# Patient Record
Sex: FEMALE | Race: WHITE | HISPANIC OR LATINO
[De-identification: names, ages, dates, MRNs, and addresses within clinical notes are randomized per-mention and may not be internally consistent; named-entity substitution may affect disease eponyms.]

---

## 2017-02-24 ENCOUNTER — APPOINTMENT (OUTPATIENT)
Dept: DERMATOLOGY | Facility: CLINIC | Age: 72
End: 2017-02-24

## 2017-04-17 ENCOUNTER — APPOINTMENT (OUTPATIENT)
Dept: DERMATOLOGY | Facility: CLINIC | Age: 72
End: 2017-04-17

## 2018-03-01 ENCOUNTER — APPOINTMENT (OUTPATIENT)
Dept: DERMATOLOGY | Facility: CLINIC | Age: 73
End: 2018-03-01
Payer: MEDICARE

## 2018-03-01 PROCEDURE — 99214 OFFICE O/P EST MOD 30 MIN: CPT

## 2018-03-06 ENCOUNTER — TRANSCRIPTION ENCOUNTER (OUTPATIENT)
Age: 73
End: 2018-03-06

## 2019-03-05 ENCOUNTER — APPOINTMENT (OUTPATIENT)
Dept: DERMATOLOGY | Facility: CLINIC | Age: 74
End: 2019-03-05

## 2019-07-16 ENCOUNTER — APPOINTMENT (OUTPATIENT)
Dept: DERMATOLOGY | Facility: CLINIC | Age: 74
End: 2019-07-16
Payer: MEDICARE

## 2019-07-16 ENCOUNTER — RESULT REVIEW (OUTPATIENT)
Age: 74
End: 2019-07-16

## 2019-07-16 PROCEDURE — 99213 OFFICE O/P EST LOW 20 MIN: CPT | Mod: 25

## 2019-07-16 PROCEDURE — 17262 DSTRJ MAL LES T/A/L 1.1-2.0: CPT

## 2020-09-15 ENCOUNTER — RESULT REVIEW (OUTPATIENT)
Age: 75
End: 2020-09-15

## 2020-09-15 ENCOUNTER — APPOINTMENT (OUTPATIENT)
Dept: DERMATOLOGY | Facility: CLINIC | Age: 75
End: 2020-09-15
Payer: MEDICARE

## 2020-09-15 PROCEDURE — 11102 TANGNTL BX SKIN SINGLE LES: CPT

## 2020-09-15 PROCEDURE — 99214 OFFICE O/P EST MOD 30 MIN: CPT | Mod: 25

## 2020-10-19 ENCOUNTER — APPOINTMENT (OUTPATIENT)
Dept: DERMATOLOGY | Facility: CLINIC | Age: 75
End: 2020-10-19
Payer: MEDICARE

## 2020-10-19 PROCEDURE — 17262 DSTRJ MAL LES T/A/L 1.1-2.0: CPT

## 2020-10-19 PROCEDURE — 99213 OFFICE O/P EST LOW 20 MIN: CPT | Mod: 25

## 2021-12-06 ENCOUNTER — APPOINTMENT (OUTPATIENT)
Dept: DERMATOLOGY | Facility: CLINIC | Age: 76
End: 2021-12-06
Payer: MEDICARE

## 2021-12-06 PROCEDURE — 99214 OFFICE O/P EST MOD 30 MIN: CPT | Mod: 25

## 2021-12-06 PROCEDURE — 17000 DESTRUCT PREMALG LESION: CPT

## 2021-12-06 PROCEDURE — 17003 DESTRUCT PREMALG LES 2-14: CPT

## 2022-01-18 ENCOUNTER — APPOINTMENT (OUTPATIENT)
Dept: DERMATOLOGY | Facility: CLINIC | Age: 77
End: 2022-01-18
Payer: MEDICARE

## 2022-01-18 PROCEDURE — 99214 OFFICE O/P EST MOD 30 MIN: CPT

## 2022-09-12 ENCOUNTER — INPATIENT (INPATIENT)
Facility: HOSPITAL | Age: 77
LOS: 7 days | Discharge: NURSING, REHAB OR LONG-TERM CARE | DRG: 371 | End: 2022-09-20
Attending: HOSPITALIST | Admitting: STUDENT IN AN ORGANIZED HEALTH CARE EDUCATION/TRAINING PROGRAM
Payer: MEDICARE

## 2022-09-12 VITALS
RESPIRATION RATE: 18 BRPM | TEMPERATURE: 98 F | OXYGEN SATURATION: 96 % | DIASTOLIC BLOOD PRESSURE: 80 MMHG | HEART RATE: 90 BPM | WEIGHT: 160.06 LBS | SYSTOLIC BLOOD PRESSURE: 137 MMHG | HEIGHT: 68 IN

## 2022-09-12 DIAGNOSIS — K55.9 VASCULAR DISORDER OF INTESTINE, UNSPECIFIED: ICD-10-CM

## 2022-09-12 DIAGNOSIS — Z98.89 OTHER SPECIFIED POSTPROCEDURAL STATES: Chronic | ICD-10-CM

## 2022-09-12 LAB
ALBUMIN SERPL ELPH-MCNC: 2.6 G/DL — LOW (ref 3.3–5.2)
ALP SERPL-CCNC: 99 U/L — SIGNIFICANT CHANGE UP (ref 40–120)
ALT FLD-CCNC: 28 U/L — SIGNIFICANT CHANGE UP
ANION GAP SERPL CALC-SCNC: 9 MMOL/L — SIGNIFICANT CHANGE UP (ref 5–17)
APPEARANCE UR: CLEAR — SIGNIFICANT CHANGE UP
AST SERPL-CCNC: 53 U/L — HIGH
BACTERIA # UR AUTO: ABNORMAL
BASE EXCESS BLDV CALC-SCNC: 5.9 MMOL/L — HIGH (ref -2–3)
BASOPHILS # BLD AUTO: 0.06 K/UL — SIGNIFICANT CHANGE UP (ref 0–0.2)
BASOPHILS NFR BLD AUTO: 0.9 % — SIGNIFICANT CHANGE UP (ref 0–2)
BILIRUB SERPL-MCNC: 0.8 MG/DL — SIGNIFICANT CHANGE UP (ref 0.4–2)
BILIRUB UR-MCNC: NEGATIVE — SIGNIFICANT CHANGE UP
BUN SERPL-MCNC: 40.9 MG/DL — HIGH (ref 8–20)
CA-I SERPL-SCNC: 1.13 MMOL/L — LOW (ref 1.15–1.33)
CALCIUM SERPL-MCNC: 8.7 MG/DL — SIGNIFICANT CHANGE UP (ref 8.4–10.5)
CHLORIDE BLDV-SCNC: 101 MMOL/L — SIGNIFICANT CHANGE UP (ref 98–107)
CHLORIDE SERPL-SCNC: 102 MMOL/L — SIGNIFICANT CHANGE UP (ref 98–107)
CK SERPL-CCNC: 24 U/L — LOW (ref 25–170)
CO2 SERPL-SCNC: 27 MMOL/L — SIGNIFICANT CHANGE UP (ref 22–29)
COLOR SPEC: YELLOW — SIGNIFICANT CHANGE UP
CREAT SERPL-MCNC: 1.06 MG/DL — SIGNIFICANT CHANGE UP (ref 0.5–1.3)
DIFF PNL FLD: ABNORMAL
EGFR: 53 ML/MIN/1.73M2 — LOW
EOSINOPHIL # BLD AUTO: 0.25 K/UL — SIGNIFICANT CHANGE UP (ref 0–0.5)
EOSINOPHIL NFR BLD AUTO: 3.5 % — SIGNIFICANT CHANGE UP (ref 0–6)
EPI CELLS # UR: SIGNIFICANT CHANGE UP
FLUAV AG NPH QL: SIGNIFICANT CHANGE UP
FLUBV AG NPH QL: SIGNIFICANT CHANGE UP
GAS PNL BLDV: 133 MMOL/L — LOW (ref 136–145)
GAS PNL BLDV: SIGNIFICANT CHANGE UP
GLUCOSE BLDV-MCNC: 72 MG/DL — SIGNIFICANT CHANGE UP (ref 70–99)
GLUCOSE SERPL-MCNC: 72 MG/DL — SIGNIFICANT CHANGE UP (ref 70–99)
GLUCOSE UR QL: NEGATIVE MG/DL — SIGNIFICANT CHANGE UP
HCO3 BLDV-SCNC: 31 MMOL/L — HIGH (ref 22–29)
HCT VFR BLD CALC: 34.7 % — SIGNIFICANT CHANGE UP (ref 34.5–45)
HCT VFR BLDA CALC: 35 % — SIGNIFICANT CHANGE UP
HGB BLD CALC-MCNC: 11.6 G/DL — LOW (ref 11.7–16.1)
HGB BLD-MCNC: 11.7 G/DL — SIGNIFICANT CHANGE UP (ref 11.5–15.5)
HYALINE CASTS # UR AUTO: ABNORMAL /LPF
KETONES UR-MCNC: ABNORMAL
LACTATE BLDV-MCNC: 0.8 MMOL/L — SIGNIFICANT CHANGE UP (ref 0.5–2)
LEUKOCYTE ESTERASE UR-ACNC: ABNORMAL
LIDOCAIN IGE QN: 41 U/L — SIGNIFICANT CHANGE UP (ref 22–51)
LYMPHOCYTES # BLD AUTO: 0.88 K/UL — LOW (ref 1–3.3)
LYMPHOCYTES # BLD AUTO: 12.3 % — LOW (ref 13–44)
MANUAL SMEAR VERIFICATION: SIGNIFICANT CHANGE UP
MCHC RBC-ENTMCNC: 31.3 PG — SIGNIFICANT CHANGE UP (ref 27–34)
MCHC RBC-ENTMCNC: 33.7 GM/DL — SIGNIFICANT CHANGE UP (ref 32–36)
MCV RBC AUTO: 92.8 FL — SIGNIFICANT CHANGE UP (ref 80–100)
MONOCYTES # BLD AUTO: 0.32 K/UL — SIGNIFICANT CHANGE UP (ref 0–0.9)
MONOCYTES NFR BLD AUTO: 4.4 % — SIGNIFICANT CHANGE UP (ref 2–14)
NEUTROPHILS # BLD AUTO: 5.65 K/UL — SIGNIFICANT CHANGE UP (ref 1.8–7.4)
NEUTROPHILS NFR BLD AUTO: 78 % — HIGH (ref 43–77)
NEUTS BAND # BLD: 0.9 % — SIGNIFICANT CHANGE UP (ref 0–8)
NITRITE UR-MCNC: NEGATIVE — SIGNIFICANT CHANGE UP
OB PNL STL: POSITIVE
PCO2 BLDV: 54 MMHG — HIGH (ref 39–42)
PH BLDV: 7.37 — SIGNIFICANT CHANGE UP (ref 7.32–7.43)
PH UR: 6 — SIGNIFICANT CHANGE UP (ref 5–8)
PLAT MORPH BLD: NORMAL — SIGNIFICANT CHANGE UP
PLATELET # BLD AUTO: 449 K/UL — HIGH (ref 150–400)
PO2 BLDV: 51 MMHG — HIGH (ref 25–45)
POTASSIUM BLDV-SCNC: 4.7 MMOL/L — SIGNIFICANT CHANGE UP (ref 3.5–5.1)
POTASSIUM SERPL-MCNC: 4.6 MMOL/L — SIGNIFICANT CHANGE UP (ref 3.5–5.3)
POTASSIUM SERPL-SCNC: 4.6 MMOL/L — SIGNIFICANT CHANGE UP (ref 3.5–5.3)
PROT SERPL-MCNC: 5.6 G/DL — LOW (ref 6.6–8.7)
PROT UR-MCNC: 15
RBC # BLD: 3.74 M/UL — LOW (ref 3.8–5.2)
RBC # FLD: 13 % — SIGNIFICANT CHANGE UP (ref 10.3–14.5)
RBC BLD AUTO: NORMAL — SIGNIFICANT CHANGE UP
RBC CASTS # UR COMP ASSIST: ABNORMAL /HPF (ref 0–4)
RSV RNA NPH QL NAA+NON-PROBE: SIGNIFICANT CHANGE UP
SAO2 % BLDV: 79.4 % — SIGNIFICANT CHANGE UP
SARS-COV-2 RNA SPEC QL NAA+PROBE: SIGNIFICANT CHANGE UP
SODIUM SERPL-SCNC: 137 MMOL/L — SIGNIFICANT CHANGE UP (ref 135–145)
SP GR SPEC: 1.01 — SIGNIFICANT CHANGE UP (ref 1.01–1.02)
UROBILINOGEN FLD QL: NEGATIVE MG/DL — SIGNIFICANT CHANGE UP
WBC # BLD: 7.16 K/UL — SIGNIFICANT CHANGE UP (ref 3.8–10.5)
WBC # FLD AUTO: 7.16 K/UL — SIGNIFICANT CHANGE UP (ref 3.8–10.5)
WBC UR QL: SIGNIFICANT CHANGE UP /HPF (ref 0–5)

## 2022-09-12 PROCEDURE — 99285 EMERGENCY DEPT VISIT HI MDM: CPT

## 2022-09-12 PROCEDURE — 74177 CT ABD & PELVIS W/CONTRAST: CPT | Mod: 26,MA

## 2022-09-12 PROCEDURE — 70450 CT HEAD/BRAIN W/O DYE: CPT | Mod: 26,MA

## 2022-09-12 PROCEDURE — 71045 X-RAY EXAM CHEST 1 VIEW: CPT | Mod: 26

## 2022-09-12 PROCEDURE — 99223 1ST HOSP IP/OBS HIGH 75: CPT | Mod: FS

## 2022-09-12 PROCEDURE — 72125 CT NECK SPINE W/O DYE: CPT | Mod: 26,MA

## 2022-09-12 PROCEDURE — 93010 ELECTROCARDIOGRAM REPORT: CPT

## 2022-09-12 PROCEDURE — 99223 1ST HOSP IP/OBS HIGH 75: CPT

## 2022-09-12 RX ORDER — FAMOTIDINE 10 MG/ML
20 INJECTION INTRAVENOUS ONCE
Refills: 0 | Status: COMPLETED | OUTPATIENT
Start: 2022-09-12 | End: 2022-09-12

## 2022-09-12 RX ORDER — ONDANSETRON 8 MG/1
4 TABLET, FILM COATED ORAL ONCE
Refills: 0 | Status: COMPLETED | OUTPATIENT
Start: 2022-09-12 | End: 2022-09-12

## 2022-09-12 RX ORDER — PREGABALIN 225 MG/1
5000 CAPSULE ORAL DAILY
Refills: 0 | Status: DISCONTINUED | OUTPATIENT
Start: 2022-09-12 | End: 2022-09-12

## 2022-09-12 RX ORDER — ACETAMINOPHEN 500 MG
1000 TABLET ORAL ONCE
Refills: 0 | Status: COMPLETED | OUTPATIENT
Start: 2022-09-12 | End: 2022-09-12

## 2022-09-12 RX ORDER — HALOPERIDOL DECANOATE 100 MG/ML
2 INJECTION INTRAMUSCULAR ONCE
Refills: 0 | Status: COMPLETED | OUTPATIENT
Start: 2022-09-12 | End: 2022-09-12

## 2022-09-12 RX ORDER — HYDROMORPHONE HYDROCHLORIDE 2 MG/ML
0.5 INJECTION INTRAMUSCULAR; INTRAVENOUS; SUBCUTANEOUS ONCE
Refills: 0 | Status: DISCONTINUED | OUTPATIENT
Start: 2022-09-12 | End: 2022-09-12

## 2022-09-12 RX ORDER — LISINOPRIL 2.5 MG/1
10 TABLET ORAL DAILY
Refills: 0 | Status: DISCONTINUED | OUTPATIENT
Start: 2022-09-12 | End: 2022-09-20

## 2022-09-12 RX ORDER — CIPROFLOXACIN LACTATE 400MG/40ML
400 VIAL (ML) INTRAVENOUS EVERY 12 HOURS
Refills: 0 | Status: DISCONTINUED | OUTPATIENT
Start: 2022-09-12 | End: 2022-09-15

## 2022-09-12 RX ORDER — SODIUM CHLORIDE 9 MG/ML
1000 INJECTION, SOLUTION INTRAVENOUS
Refills: 0 | Status: DISCONTINUED | OUTPATIENT
Start: 2022-09-12 | End: 2022-09-17

## 2022-09-12 RX ORDER — ALPRAZOLAM 0.25 MG
0.5 TABLET ORAL
Refills: 0 | Status: DISCONTINUED | OUTPATIENT
Start: 2022-09-12 | End: 2022-09-19

## 2022-09-12 RX ORDER — SODIUM CHLORIDE 9 MG/ML
1000 INJECTION, SOLUTION INTRAVENOUS ONCE
Refills: 0 | Status: COMPLETED | OUTPATIENT
Start: 2022-09-12 | End: 2022-09-12

## 2022-09-12 RX ORDER — METRONIDAZOLE 500 MG
500 TABLET ORAL EVERY 8 HOURS
Refills: 0 | Status: DISCONTINUED | OUTPATIENT
Start: 2022-09-12 | End: 2022-09-15

## 2022-09-12 RX ORDER — FOLIC ACID 0.8 MG
1 TABLET ORAL DAILY
Refills: 0 | Status: DISCONTINUED | OUTPATIENT
Start: 2022-09-12 | End: 2022-09-20

## 2022-09-12 RX ORDER — ACETAMINOPHEN 500 MG
650 TABLET ORAL EVERY 6 HOURS
Refills: 0 | Status: DISCONTINUED | OUTPATIENT
Start: 2022-09-12 | End: 2022-09-20

## 2022-09-12 RX ADMIN — HYDROMORPHONE HYDROCHLORIDE 0.5 MILLIGRAM(S): 2 INJECTION INTRAMUSCULAR; INTRAVENOUS; SUBCUTANEOUS at 12:06

## 2022-09-12 RX ADMIN — HYDROMORPHONE HYDROCHLORIDE 0.5 MILLIGRAM(S): 2 INJECTION INTRAMUSCULAR; INTRAVENOUS; SUBCUTANEOUS at 13:00

## 2022-09-12 RX ADMIN — ONDANSETRON 4 MILLIGRAM(S): 8 TABLET, FILM COATED ORAL at 10:06

## 2022-09-12 RX ADMIN — Medication 100 MILLIGRAM(S): at 16:26

## 2022-09-12 RX ADMIN — SODIUM CHLORIDE 1000 MILLILITER(S): 9 INJECTION, SOLUTION INTRAVENOUS at 09:16

## 2022-09-12 RX ADMIN — FAMOTIDINE 20 MILLIGRAM(S): 10 INJECTION INTRAVENOUS at 09:01

## 2022-09-12 RX ADMIN — Medication 1000 MILLIGRAM(S): at 09:17

## 2022-09-12 RX ADMIN — SODIUM CHLORIDE 1000 MILLILITER(S): 9 INJECTION, SOLUTION INTRAVENOUS at 12:00

## 2022-09-12 RX ADMIN — HALOPERIDOL DECANOATE 2 MILLIGRAM(S): 100 INJECTION INTRAMUSCULAR at 17:09

## 2022-09-12 RX ADMIN — Medication 200 MILLIGRAM(S): at 17:30

## 2022-09-12 RX ADMIN — SODIUM CHLORIDE 100 MILLILITER(S): 9 INJECTION, SOLUTION INTRAVENOUS at 17:33

## 2022-09-12 RX ADMIN — HALOPERIDOL DECANOATE 2 MILLIGRAM(S): 100 INJECTION INTRAMUSCULAR at 20:51

## 2022-09-12 RX ADMIN — Medication 1000 MILLIGRAM(S): at 10:05

## 2022-09-12 RX ADMIN — Medication 400 MILLIGRAM(S): at 09:03

## 2022-09-12 NOTE — ED ADULT NURSE REASSESSMENT NOTE - NS ED NURSE REASSESS COMMENT FT1
Pt had a colonoscopy 3/28/2019 - unaware of results. GI doctor inquiring about test
Pt refusing to take Xanax or any other ordered medication. Refusing all medical treatment. Tried to redirect multiple times as well as family members attempting. MD Resendez made aware and IM medication to be ordered
Pt returned from CT. New IV in place. #20 to RFA. IV #20 to LAC infiltrated in CT. Bandage and dressing applied. Assisted to bathroom x2 with a steady gait. Will continue to monitor. Awaiting CT results. Made aware of status and agrees. Pt appears slightly disoriented r/t pain medication given
Pt attempting to get out of stretcher. AAO x2 to person and place. Pt stating that she wants to go home and take care of her dog. Pt trying to put her feet in the gown and also stripping naked. Multiple attempts to verbally redirect pt. Family brought to bedside to calm pt. MD Resendez made aware and to order medications.
Pt states increase in pain. MD made aware. Dilaudid to be ordered
Zofran 4 mg VO RB and verified to be given to pt. Provided perineal care to pt for incontinence of stool. Pt changed into gown

## 2022-09-12 NOTE — PATIENT PROFILE ADULT - FALL HARM RISK - UNIVERSAL INTERVENTIONS
Bed in lowest position, wheels locked, appropriate side rails in place/Call bell, personal items and telephone in reach/Instruct patient to call for assistance before getting out of bed or chair/Non-slip footwear when patient is out of bed/[place] to call system/Physically safe environment - no spills, clutter or unnecessary equipment/Purposeful Proactive Rounding/Room/bathroom lighting operational, light cord in reach

## 2022-09-12 NOTE — ED CLERICAL - NS ED CLERK UNITS
Recommend eucerin cream and vaseline for symptomatic relief. Can also ice the region for comfort as tolerated. Will recommend hydrocortisone 1% cream, available over the counter, as needed for breakthrough itchiness. If new symptoms develop such as weight loss, fevers, chills, night sweats, redness around the region, nipple purulence or discharge, please return to clinic or go to ED for further attention.    ANY ANY/ISO 5213-01/[id number]

## 2022-09-12 NOTE — ED PROVIDER NOTE - OBJECTIVE STATEMENT
78 yo F with pmh RA on MTX 76 yo F with pmh RA on MTX recent admission to [facility] for colitis and pyelo discharged approx 2 weeks ago p/w persistent intermittent diffuse abdominal pain since discharge. endorses nausea, no emesis, as well as multiple episodes of diarrhea over the last two days, no resolving with some red blood in stool. no fevers, no dysuria. per family at bedside, patient has been more altered, confused, looking for her late husbands. normally a&o x 3. family also endorsing decreased PO intake. daughter in law and son in law at bedside. daughter in law states that she found patient down on the floor (no trauma, lowered herself to ground). also notes that patient was trying to relieve her constipation (which brought her into hospital for last admission) with a pen.

## 2022-09-12 NOTE — ED ADULT NURSE NOTE - SUICIDE SCREENING QUESTION 1
Per Dr. Pamela Hernandez at [facility] generic tecfidera is preferred with her insurance.  Ok per Dr. Santiago for patient to be put on generic Tecfidera.  Per Mary she will update the prior authorization and we will receive a faxed confirmation then we can send to patient's pharmacy.   No

## 2022-09-12 NOTE — CHART NOTE - NSCHARTNOTEFT_GEN_A_CORE
Pt confused   continually trying to get out of bed  No improvement with haldol  daughter at bedside holding pt in bed  Pt Alert, confused  moves all extremities without difficulty  speech clear  VSS NAD  Ativan 1 mg IVP x1  1:1 ordered for pt safety

## 2022-09-12 NOTE — CHART NOTE - NSCHARTNOTEFT_GEN_A_CORE
Pt admitted with increased confusion  Head CT completed  Haldol given earlier with good effect  Pt confused and agitated at this time  VSS  Haldol 2mg IM x 1 ordered  Continue to monitor

## 2022-09-12 NOTE — H&P ADULT - NSHPPHYSICALEXAM_GEN_ALL_CORE
General: appears uncomfortable in bed, NAD  HEENT: atraumatic, normocephalic. PERRLA, EOMI  Cardio: regular rate and rhythm  Resp: clear to auscultation, no increased work of breathing  Abdomen: Tender to palpation on LUQ and LLQ  Skin: Ecchymosis on bilateral upper extremities  Neuro: AOx3, occasional discrepencies in history compared to daughter's account

## 2022-09-12 NOTE — ED PROVIDER NOTE - CLINICAL SUMMARY MEDICAL DECISION MAKING FREE TEXT BOX
presents for abdominal pain and FTT in the setting of recent colitis and UTI/pyelo. completed abx course for urinary infection (cefpodoxime). exam as noted above. will get xray to eval for free air given patient was reportedly trying to relieve constipation with pen, although abd is soft. will also eval for colitis/complications of colitis vs other surgical pathology in abdomen. no concern for mesenteric ischemia or ischemic colitis at this time. no gross lood on rectal exam. will proceed with CT with iv contrast, labs, symptom control, UA. To be admitted ryan given patient not tolerating PO at home.  per daughter in law, patient will likely not tolerate PO contrast

## 2022-09-12 NOTE — CONSULT NOTE ADULT - SUBJECTIVE AND OBJECTIVE BOX
HISTORY OF PRESENT ILLNESS: 76 yo female with PMHx rheumatoid arthritis on Methotrexate, HTN, fibromyalgia presented to ED with intermittent diffuse abdominal pain x 3 weeks. Patient was recently admitted to [facility] with abdominal pain, treated for UTI  and pyelonephritis, was discharged home 2 weeks ago. Patient continued to have intermittent abdominal pain. Patient reports occasional nausea, denies vomiting, constipation, diarrhea, or melena. Last BM here in ED, reported as loose brown stool. Patient never went to a gastroenterologist or had a colonoscopy/ EGD in the past. Patient's daughter at the bedside reports have 3-4 loose BM per day. Patient was recently started on Diclofenac "1 tab 2 times a day" and also takes Methotrexate. Not on any blood thinners. Patient endorses some  weight loss, refuses to answer further details. Denies use of ETOH. Former smoker quit 40 years ago. Denies nausea, vomiting, abdominal pain, chest pain, shortness of breath, hematemesis, hematochezia, melena.enies fever, chills, chest pain, palpitation. CT abdomen revealed colitis extending from distal transverse colon through sigmoids colon.       Review of Systems:  . Constitutional: No fever, chills, no weight gain, weight loss   · ENMT: no vertigo, no throat pain  . Neck: No pain or stiffness  · Respiratory and Thorax: No shortness of breath, no cough, no wheezing  · Cardiovascular: No chest pain, palpitation, no dizziness, no orthopnea,   · Gastrointestinal: see above.  · Genitourinary: No hematuria, no dysuria  · Musculoskeletal: Negative  · Neurological: no headache, no vision changes, no slurred speech  · Psychiatric: no agitation, no anxiety  · Hematology/Lymphatics: negative  · Endocrine: negative      ROS: A 14-point review of systems was completed and was otherwise negative save what was reported in the HPI.    PAST MEDICAL/SURGICAL HISTORY:  Hypertension    Rheumatoid arthritis    Depressed    Fibromyalgia    H/O lithotripsy      SOCIAL HISTORY:  - TOBACCO: Denies  - ALCOHOL: Denies  - ILLICIT DRUG USE: Denies    FAMILY HISTORY:  No known history of gastrointestinal or liver disease;      HOME MEDICATIONS:  abatacept: 400 milligram(s) intravenous every 30 days (12 Sep 2022 14:02)  acetaminophen 325 mg oral tablet: 2 tab(s) orally every 6 hours, As Needed (12 Sep 2022 14:03)  ALPRAZolam 0.5 mg oral tablet: 1 tab(s) orally 2 times a day, As Needed (12 Sep 2022 14:)  biotin 5000 mcg oral capsule: 1 cap(s) orally once a day (12 Sep 2022 14:)  cefpodoxime 200 mg oral tablet: 1 tab(s) orally every 12 hours (12 Sep 2022 14:03)  cyanocobalamin 5000 mcg sublingual tablet: 1 tab(s) sublingual once a day (12 Sep 2022 14:)  diclofenac sodium 75 mg oral delayed release tablet: 1 tab(s) orally 2 times a day (12 Sep 2022 14:03)  folic acid 1 mg oral tablet: 1 tab(s) orally 2 times a day (12 Sep 2022 14:)  methotrexate 2.5 mg oral tablet: 6 tab(s) orally once a week on  (12 Sep 2022 14:03)  ramipril 2.5 mg oral capsule: 1 cap(s) orally once a day (12 Sep 2022 14:03)  triamcinolone 0.1% topical cream: Apply topically to affected area 3 times a day, As Needed (12 Sep 2022 14:02)  Vitamin D2 1.25 mg (50,000 intl units) oral capsule: 1 cap(s) orally 2 times a week (12 Sep 2022 14:)    INPATIENT MEDICATIONS:  MEDICATIONS  (STANDING):    MEDICATIONS  (PRN):    ALLERGIES:  adhesives (Unknown)  latex (Blisters)  statins (Unknown)    T(C): 36.6 (22 @ 07:47), Max: 36.6 (22 @ 07:47)  HR: 90 (22 @ 07:47) (90 - 90)  BP: 137/80 (22 @ 07:47) (137/80 - 137/80)  RR: 18 (22 @ 07:47) (18 - 18)  SpO2: 96% (22 @ 07:47) (96% - 96%)      PHYSICAL EXAM:  Constitutional: Thin appearing elderly woman, in no acute distress  Neuro: Awake alert, oriented  HEENT: PERRL, anicteric sclerae  Neck: supple, no JVD  CV: Irregular, S1S2,   Pulm/chest: lung sounds clear bilaterally, no accessory muscle use noted  Abd: soft, nondistended +diffuse abdominal tenderness +bowel sounds. No rigidity, rebound tenderness, guarding.   Rectal exam: Brown soft stool on glove. No melena or bleeding   Ext: no Cyanosis, clubbing or edema  Skin: warm, no jaundice   Psych: calm, cooperative      LABS:             11.7   7.16  )-----------( 449      (  @ 08:52 )             34.7             137  |  102  |  40.9<H>  ----------------------------<  72  4.6   |  27.0  |  1.06    Ca    8.7      12 Sep 2022 08:52    TPro  5.6<L>  /  Alb  2.6<L>  /  TBili  0.8  /  DBili  x   /  AST  53<H>  /  ALT  28  /  AlkPhos  99      LIVER FUNCTIONS - ( 12 Sep 2022 08:52 )  Alb: 2.6 g/dL / Pro: 5.6 g/dL / ALK PHOS: 99 U/L / ALT: 28 U/L / AST: 53 U/L / GGT: x             Lipase, Serum: 41 U/L (22 @ 08:52)        Urinalysis Basic - ( 12 Sep 2022 09:30 )    Color: Yellow / Appearance: Clear / S.015 / pH: x  Gluc: x / Ketone: Small  / Bili: Negative / Urobili: Negative mg/dL   Blood: x / Protein: 15 / Nitrite: Negative   Leuk Esterase: Moderate / RBC: 3-5 /HPF / WBC 3-5 /HPF   Sq Epi: x / Non Sq Epi: Few / Bacteria: Few        IMAGING: I personally reviewed the XXXX, and I agree with the radiologist's interpretation as described below:

## 2022-09-12 NOTE — H&P ADULT - NSHPLABSRESULTS_GEN_ALL_CORE
CBC Full  -  ( 12 Sep 2022 08:52 )  WBC Count : 7.16 K/uL  RBC Count : 3.74 M/uL  Hemoglobin : 11.7 g/dL  Hematocrit : 34.7 %  Platelet Count - Automated : 449 K/uL  Mean Cell Volume : 92.8 fl  Mean Cell Hemoglobin : 31.3 pg  Mean Cell Hemoglobin Concentration : 33.7 gm/dL  Auto Neutrophil # : 5.65 K/uL  Auto Lymphocyte # : 0.88 K/uL  Auto Monocyte # : 0.32 K/uL  Auto Eosinophil # : 0.25 K/uL  Auto Basophil # : 0.06 K/uL  Auto Neutrophil % : 78.0 %  Auto Lymphocyte % : 12.3 %  Auto Monocyte % : 4.4 %  Auto Eosinophil % : 3.5 %  Auto Basophil % : 0.9 %      09-12    137  |  102  |  40.9<H>  ----------------------------<  72  4.6   |  27.0  |  1.06    Ca    8.7      12 Sep 2022 08:52    TPro  5.6<L>  /  Alb  2.6<L>  /  TBili  0.8  /  DBili  x   /  AST  53<H>  /  ALT  28  /  AlkPhos  99  09-12

## 2022-09-12 NOTE — ED ADULT NURSE NOTE - OBJECTIVE STATEMENT
Pt to ED from home c/o b/l lower quadrant pain associated with nausea and decreased appetite. Family at bedside states she was admitted to [facility] 2 wks ago for UTI and colitis. States since discharge, pt has had a decreased appetite and acute AMS. Also reports pt is "failing to thrive." Pt arrives AAO x3. Moaning in pain. Assisted MD with rectal exam and rectal temp. No temp. Denies fevers.

## 2022-09-12 NOTE — ED ADULT TRIAGE NOTE - CHIEF COMPLAINT QUOTE
biba home - a&ox4 LLQ/RLQ, + nausea x 2 weeks. recently d/c from [facility] for + colitis, UTI, pyelo. family reports pt intermittently having AMS, decreased appetite/intake. denies cp/sob.

## 2022-09-12 NOTE — ED PROVIDER NOTE - PROGRESS NOTE DETAILS
xray chest neg for free air Mary Anne Moore MD Attending Physician- patient reassessed, patient in line for CT. still endorsing abdominal pain with tenderness on exam. s/p tylenol and pepcid. will hold off on motrin/toradol given occult positive stool. patient and daughter in law state that morphine does not help pain. would like to try dilaudid 0.5 mg

## 2022-09-12 NOTE — H&P ADULT - ASSESSMENT
78 yo female w PMHx RA, HTN, fibromyalgia presents complaining of diffuse abdominal pain. Per pt and her daughter, pt admitted to [facility] for constipation and abdominal pain, discharged 2 weeks ago with dx of UTI and pyelonephritis. Since discharge, pt has had continued diffuse abdominal pain that waxes and wanes through the day, relieved especially after sleeping through the night. Pt also endorses decreased appetite, nausea, and watery, malodorous diarrhea 3-4 times daily. Patient's daughter notes increasing confusion compared to normal baseline.  Pt denies fevers, chills, headache, vomiting, chest pain, palpitations, shortness of breath, and dysuria.       1. Colitis likely ischemic vs infectious   Admit to medicine   Afebrile, NO WBC   CTA abdomen done and results reviewed   Send stool studies and C-diff PCR   Keep NPO   IV fluids   Start Cipro and Metronidazole   Follow cultures   GI consult called       2. HTN  Start Lisinopril 10 mg po daily instead of ramipril     3. Hx of RA and Fibromyalgia   On weekly methotrexate, last dose on friday   c/w with Supplements     4. Anxiety   c/w Xanax PRN     5. DVT prophylaxis: compression boots     Plan of care discussed with patient and daughter at bedside

## 2022-09-12 NOTE — ED PROVIDER NOTE - PHYSICAL EXAMINATION
PHYSICAL EXAM:   General: appears uncomfortable, laying on her side, rigoring in bed  HEENT: NC/AT, PERRLA, airway patent, no cspine tenderness  Cardiovascular: regular rate and rhythm, + S1/S2, no murmurs, rubs, gallops appreciated  Respiratory:  nonlabored respirations  Abdominal: soft, diffusely tender, nondistended, +guarding  Back: no costovertebral tenderness, no midline spinal tenderness  Extremities: no LE edema or calf tenderness b/l. Radial pulses equal and strong b/l  Neuro: Alert and oriented x3. no lateralizing deficits  Psychiatric: appropriate mood and affect.   Skin: scattered ecchymosis to extremities (states from prior IV draws)  rectal: performed with JESSIKA paredes as chaperone: no external hemorrhoids, no internal masses palpated, brown stool  -Mary Anne Moore MD Attending Physician

## 2022-09-12 NOTE — CONSULT NOTE ADULT - ASSESSMENT
76 yo female with PMHx rheumatoid arthritis on Methotrexate, HTN, fibromyalgia presented to ED with intermittent diffuse abdominal pain x 3 weeks. CT abdomen revealed colitis extending from distal transverse colon through sigmoids colon. GI consult for positive FOBT, colitis.      Plan:  Most likely from ischemic colitis. Normal lactate on arrival. 12 lead EKG shows NSR with occasional APCs, no atrial fibrillation or aflutter.   Continue IV fluids  IV antibiotic Cipro and Flagyl   GI PCR and C diff to r/o infectious colitis   Avoid hypotension, dehydration  Serial abdominal exam  Trend CBC, renal function  Analgesics as needed. Avoid narcotics. Avoid constipation.   Trend CBC, transfuse to Hgb 7 or higher.

## 2022-09-12 NOTE — CONSULT NOTE ADULT - NS ATTEND AMEND GEN_ALL_CORE FT
Patient seen and examined.  Patient with abdominal pain and diarrhea.  Unclear history.  Difficult to obtain history from the patient.  Daughter is at bedside.  Recent antibiotics for UTI.  Changes of segmental colitis.  Question of infectious versus ischemic colitis.  Will recommend antibiotics and then consider stool studies.  GI PCR, C. difficile.  Once infectious work-up is complete, may need eventual outpatient colonoscopy.

## 2022-09-12 NOTE — H&P ADULT - HISTORY OF PRESENT ILLNESS
76 yo female w PMHx RA, HTN, fibromyalgia presents complaining of diffuse abdominal pain. Per pt and her daughter, pt admitted to [facility] for constipation and abdominal pain, discharged 2 weeks ago with dx of UTI and pyelonephritis. Since discharge, pt has had continued diffuse abdominal pain that waxes and wanes through the day, relieved especially after sleeping through the night. Pt endorses accompanying nausea and watery, malodorous diarrhea 3-4 times daily.    Pt denies fevers, chills, headache, vomiting, chest pain, palpitations, shortness of breath, and dysuria.   Patient's daughter notes increasing confusion compared to normal baseline, as well as decreased po intake.  78 yo female w PMHx RA, HTN, fibromyalgia presents complaining of diffuse abdominal pain. Per pt and her daughter, pt admitted to [facility] for constipation and abdominal pain, discharged 2 weeks ago with dx of UTI and pyelonephritis. Since discharge, pt has had continued diffuse abdominal pain that waxes and wanes through the day, relieved especially after sleeping through the night. Pt also endorses decreased appetite, nausea, and watery, malodorous diarrhea 3-4 times daily. Patient's daughter notes increasing confusion compared to normal baseline.  Pt denies fevers, chills, headache, vomiting, chest pain, palpitations, shortness of breath, and dysuria.

## 2022-09-12 NOTE — ED PROVIDER NOTE - NS ED ROS FT
REVIEW OF SYSTEMS:  General:  no fever  Cardiac: no chest pain  Respiratory: no cough, no shortness of breath  Gastrointestinal: + abdominal pain, + nausea, no vomiting, + diarrhea with red blood  Genitourinary: , no dysuria  Neuro: +AMS  Heme: +easy bruising, not on AC  -Mary Anne Moore MD Attending Physician

## 2022-09-13 LAB
ANION GAP SERPL CALC-SCNC: 12 MMOL/L — SIGNIFICANT CHANGE UP (ref 5–17)
ANISOCYTOSIS BLD QL: SLIGHT — SIGNIFICANT CHANGE UP
BASOPHILS # BLD AUTO: 0 K/UL — SIGNIFICANT CHANGE UP (ref 0–0.2)
BASOPHILS NFR BLD AUTO: 0 % — SIGNIFICANT CHANGE UP (ref 0–2)
BUN SERPL-MCNC: 24.6 MG/DL — HIGH (ref 8–20)
CALCIUM SERPL-MCNC: 8.8 MG/DL — SIGNIFICANT CHANGE UP (ref 8.4–10.5)
CHLORIDE SERPL-SCNC: 101 MMOL/L — SIGNIFICANT CHANGE UP (ref 98–107)
CO2 SERPL-SCNC: 26 MMOL/L — SIGNIFICANT CHANGE UP (ref 22–29)
CREAT SERPL-MCNC: 0.89 MG/DL — SIGNIFICANT CHANGE UP (ref 0.5–1.3)
EGFR: 66 ML/MIN/1.73M2 — SIGNIFICANT CHANGE UP
ELLIPTOCYTES BLD QL SMEAR: SLIGHT — SIGNIFICANT CHANGE UP
EOSINOPHIL # BLD AUTO: 0.05 K/UL — SIGNIFICANT CHANGE UP (ref 0–0.5)
EOSINOPHIL NFR BLD AUTO: 0.9 % — SIGNIFICANT CHANGE UP (ref 0–6)
GLUCOSE SERPL-MCNC: 76 MG/DL — SIGNIFICANT CHANGE UP (ref 70–99)
HCT VFR BLD CALC: 34.6 % — SIGNIFICANT CHANGE UP (ref 34.5–45)
HGB BLD-MCNC: 11.6 G/DL — SIGNIFICANT CHANGE UP (ref 11.5–15.5)
LACTATE BLDV-MCNC: 1.5 MMOL/L — SIGNIFICANT CHANGE UP (ref 0.5–2)
LYMPHOCYTES # BLD AUTO: 0.62 K/UL — LOW (ref 1–3.3)
LYMPHOCYTES # BLD AUTO: 12.3 % — LOW (ref 13–44)
MAGNESIUM SERPL-MCNC: 1.8 MG/DL — SIGNIFICANT CHANGE UP (ref 1.6–2.6)
MANUAL SMEAR VERIFICATION: SIGNIFICANT CHANGE UP
MCHC RBC-ENTMCNC: 30.9 PG — SIGNIFICANT CHANGE UP (ref 27–34)
MCHC RBC-ENTMCNC: 33.5 GM/DL — SIGNIFICANT CHANGE UP (ref 32–36)
MCV RBC AUTO: 92.3 FL — SIGNIFICANT CHANGE UP (ref 80–100)
MICROCYTES BLD QL: SLIGHT — SIGNIFICANT CHANGE UP
MONOCYTES # BLD AUTO: 0.13 K/UL — SIGNIFICANT CHANGE UP (ref 0–0.9)
MONOCYTES NFR BLD AUTO: 2.6 % — SIGNIFICANT CHANGE UP (ref 2–14)
NEUTROPHILS # BLD AUTO: 4.21 K/UL — SIGNIFICANT CHANGE UP (ref 1.8–7.4)
NEUTROPHILS NFR BLD AUTO: 82.5 % — HIGH (ref 43–77)
NEUTS BAND # BLD: 1.7 % — SIGNIFICANT CHANGE UP (ref 0–8)
PLAT MORPH BLD: NORMAL — SIGNIFICANT CHANGE UP
PLATELET # BLD AUTO: 400 K/UL — SIGNIFICANT CHANGE UP (ref 150–400)
POIKILOCYTOSIS BLD QL AUTO: SLIGHT — SIGNIFICANT CHANGE UP
POLYCHROMASIA BLD QL SMEAR: SLIGHT — SIGNIFICANT CHANGE UP
POTASSIUM SERPL-MCNC: 4.4 MMOL/L — SIGNIFICANT CHANGE UP (ref 3.5–5.3)
POTASSIUM SERPL-SCNC: 4.4 MMOL/L — SIGNIFICANT CHANGE UP (ref 3.5–5.3)
RBC # BLD: 3.75 M/UL — LOW (ref 3.8–5.2)
RBC # FLD: 13.2 % — SIGNIFICANT CHANGE UP (ref 10.3–14.5)
RBC BLD AUTO: ABNORMAL
SMUDGE CELLS # BLD: PRESENT — SIGNIFICANT CHANGE UP
SODIUM SERPL-SCNC: 139 MMOL/L — SIGNIFICANT CHANGE UP (ref 135–145)
TSH SERPL-MCNC: 5.66 UIU/ML — HIGH (ref 0.27–4.2)
WBC # BLD: 5 K/UL — SIGNIFICANT CHANGE UP (ref 3.8–10.5)
WBC # FLD AUTO: 5 K/UL — SIGNIFICANT CHANGE UP (ref 3.8–10.5)

## 2022-09-13 PROCEDURE — 99233 SBSQ HOSP IP/OBS HIGH 50: CPT

## 2022-09-13 RX ORDER — FOLIC ACID 0.8 MG
1 TABLET ORAL
Qty: 0 | Refills: 0 | DISCHARGE

## 2022-09-13 RX ORDER — ABATACEPT 125 MG/ML
400 INJECTION, SOLUTION SUBCUTANEOUS
Qty: 0 | Refills: 0 | DISCHARGE

## 2022-09-13 RX ORDER — HALOPERIDOL DECANOATE 100 MG/ML
5 INJECTION INTRAMUSCULAR ONCE
Refills: 0 | Status: COMPLETED | OUTPATIENT
Start: 2022-09-13 | End: 2022-09-13

## 2022-09-13 RX ORDER — ONDANSETRON 8 MG/1
4 TABLET, FILM COATED ORAL EVERY 6 HOURS
Refills: 0 | Status: DISCONTINUED | OUTPATIENT
Start: 2022-09-13 | End: 2022-09-20

## 2022-09-13 RX ORDER — ICOSAPENT ETHYL 500 MG/1
2 CAPSULE, LIQUID FILLED ORAL
Qty: 0 | Refills: 0 | DISCHARGE

## 2022-09-13 RX ORDER — MORPHINE SULFATE 50 MG/1
2 CAPSULE, EXTENDED RELEASE ORAL ONCE
Refills: 0 | Status: DISCONTINUED | OUTPATIENT
Start: 2022-09-13 | End: 2022-09-13

## 2022-09-13 RX ORDER — PREGABALIN 225 MG/1
1 CAPSULE ORAL
Qty: 0 | Refills: 0 | DISCHARGE

## 2022-09-13 RX ORDER — RAMIPRIL 5 MG
1 CAPSULE ORAL
Qty: 0 | Refills: 0 | DISCHARGE

## 2022-09-13 RX ORDER — HYDROMORPHONE HYDROCHLORIDE 2 MG/ML
0.5 INJECTION INTRAMUSCULAR; INTRAVENOUS; SUBCUTANEOUS ONCE
Refills: 0 | Status: DISCONTINUED | OUTPATIENT
Start: 2022-09-13 | End: 2022-09-13

## 2022-09-13 RX ORDER — ALPRAZOLAM 0.25 MG
1 TABLET ORAL
Qty: 0 | Refills: 0 | DISCHARGE

## 2022-09-13 RX ORDER — ERGOCALCIFEROL 1.25 MG/1
1 CAPSULE ORAL
Qty: 0 | Refills: 0 | DISCHARGE

## 2022-09-13 RX ADMIN — Medication 200 MILLIGRAM(S): at 17:12

## 2022-09-13 RX ADMIN — Medication 100 MILLIGRAM(S): at 00:20

## 2022-09-13 RX ADMIN — Medication 100 MILLIGRAM(S): at 02:25

## 2022-09-13 RX ADMIN — HALOPERIDOL DECANOATE 5 MILLIGRAM(S): 100 INJECTION INTRAMUSCULAR at 20:20

## 2022-09-13 RX ADMIN — Medication 100 MILLIGRAM(S): at 15:46

## 2022-09-13 RX ADMIN — HYDROMORPHONE HYDROCHLORIDE 0.5 MILLIGRAM(S): 2 INJECTION INTRAMUSCULAR; INTRAVENOUS; SUBCUTANEOUS at 02:24

## 2022-09-13 RX ADMIN — HYDROMORPHONE HYDROCHLORIDE 0.5 MILLIGRAM(S): 2 INJECTION INTRAMUSCULAR; INTRAVENOUS; SUBCUTANEOUS at 02:39

## 2022-09-13 RX ADMIN — SODIUM CHLORIDE 100 MILLILITER(S): 9 INJECTION, SOLUTION INTRAVENOUS at 09:03

## 2022-09-13 RX ADMIN — Medication 100 MILLIGRAM(S): at 23:19

## 2022-09-13 RX ADMIN — Medication 650 MILLIGRAM(S): at 17:58

## 2022-09-13 RX ADMIN — Medication 200 MILLIGRAM(S): at 07:24

## 2022-09-13 RX ADMIN — Medication 0.5 MILLIGRAM(S): at 14:13

## 2022-09-13 RX ADMIN — Medication 1 MILLIGRAM(S): at 00:19

## 2022-09-13 RX ADMIN — Medication 650 MILLIGRAM(S): at 19:00

## 2022-09-13 NOTE — CHART NOTE - NSCHARTNOTEFT_GEN_A_CORE
Pt confused and agitated  2 daughters at bedside  Pt awake alert  /79 P 104 R 20 PO 97% T 98.5  Abd soft nontender no distention  Pt has been urinating  CBC Lactate  Dilaudid 0.5mg IVP  Discussed with Dr Alvarez  continue to monitor Pt confused and agitated  2 daughters at bedside  Pt awake alert  /79 P 104 R 20 PO 97% T 98.5  Abd soft nontender no distention  Pt has been urinating  CBC WBC 5.00  Lactate 1.50  Dilaudid 0.5mg IVP  Discussed with Dr Alvarez  continue to monitor

## 2022-09-14 DIAGNOSIS — R19.7 DIARRHEA, UNSPECIFIED: ICD-10-CM

## 2022-09-14 LAB
-  AMIKACIN: SIGNIFICANT CHANGE UP
-  AMOXICILLIN/CLAVULANIC ACID: SIGNIFICANT CHANGE UP
-  AMPICILLIN/SULBACTAM: SIGNIFICANT CHANGE UP
-  AMPICILLIN: SIGNIFICANT CHANGE UP
-  AZTREONAM: SIGNIFICANT CHANGE UP
-  CEFAZOLIN: SIGNIFICANT CHANGE UP
-  CEFEPIME: SIGNIFICANT CHANGE UP
-  CEFOXITIN: SIGNIFICANT CHANGE UP
-  CEFTRIAXONE: SIGNIFICANT CHANGE UP
-  CIPROFLOXACIN: SIGNIFICANT CHANGE UP
-  ERTAPENEM: SIGNIFICANT CHANGE UP
-  GENTAMICIN: SIGNIFICANT CHANGE UP
-  IMIPENEM: SIGNIFICANT CHANGE UP
-  LEVOFLOXACIN: SIGNIFICANT CHANGE UP
-  MEROPENEM: SIGNIFICANT CHANGE UP
-  NITROFURANTOIN: SIGNIFICANT CHANGE UP
-  PIPERACILLIN/TAZOBACTAM: SIGNIFICANT CHANGE UP
-  TIGECYCLINE: SIGNIFICANT CHANGE UP
-  TOBRAMYCIN: SIGNIFICANT CHANGE UP
-  TRIMETHOPRIM/SULFAMETHOXAZOLE: SIGNIFICANT CHANGE UP
ALBUMIN SERPL ELPH-MCNC: 2.5 G/DL — LOW (ref 3.3–5.2)
ALP SERPL-CCNC: 73 U/L — SIGNIFICANT CHANGE UP (ref 40–120)
ALT FLD-CCNC: 17 U/L — SIGNIFICANT CHANGE UP
ANION GAP SERPL CALC-SCNC: 10 MMOL/L — SIGNIFICANT CHANGE UP (ref 5–17)
AST SERPL-CCNC: 24 U/L — SIGNIFICANT CHANGE UP
BASOPHILS # BLD AUTO: 0.04 K/UL — SIGNIFICANT CHANGE UP (ref 0–0.2)
BASOPHILS NFR BLD AUTO: 1.8 % — SIGNIFICANT CHANGE UP (ref 0–2)
BILIRUB SERPL-MCNC: 0.9 MG/DL — SIGNIFICANT CHANGE UP (ref 0.4–2)
BUN SERPL-MCNC: 11.3 MG/DL — SIGNIFICANT CHANGE UP (ref 8–20)
C DIFF BY PCR RESULT: DETECTED
C DIFF TOX GENS STL QL NAA+PROBE: SIGNIFICANT CHANGE UP
CALCIUM SERPL-MCNC: 8 MG/DL — LOW (ref 8.4–10.5)
CHLORIDE SERPL-SCNC: 102 MMOL/L — SIGNIFICANT CHANGE UP (ref 98–107)
CO2 SERPL-SCNC: 23 MMOL/L — SIGNIFICANT CHANGE UP (ref 22–29)
CREAT SERPL-MCNC: 0.66 MG/DL — SIGNIFICANT CHANGE UP (ref 0.5–1.3)
CULTURE RESULTS: SIGNIFICANT CHANGE UP
EGFR: 89 ML/MIN/1.73M2 — SIGNIFICANT CHANGE UP
EOSINOPHIL # BLD AUTO: 0.08 K/UL — SIGNIFICANT CHANGE UP (ref 0–0.5)
EOSINOPHIL NFR BLD AUTO: 3.6 % — SIGNIFICANT CHANGE UP (ref 0–6)
GIANT PLATELETS BLD QL SMEAR: PRESENT — SIGNIFICANT CHANGE UP
GLUCOSE SERPL-MCNC: 94 MG/DL — SIGNIFICANT CHANGE UP (ref 70–99)
HCT VFR BLD CALC: 32.4 % — LOW (ref 34.5–45)
HGB BLD-MCNC: 10.7 G/DL — LOW (ref 11.5–15.5)
LYMPHOCYTES # BLD AUTO: 0.78 K/UL — LOW (ref 1–3.3)
LYMPHOCYTES # BLD AUTO: 33.3 % — SIGNIFICANT CHANGE UP (ref 13–44)
MANUAL SMEAR VERIFICATION: SIGNIFICANT CHANGE UP
MCHC RBC-ENTMCNC: 30.7 PG — SIGNIFICANT CHANGE UP (ref 27–34)
MCHC RBC-ENTMCNC: 33 GM/DL — SIGNIFICANT CHANGE UP (ref 32–36)
MCV RBC AUTO: 93.1 FL — SIGNIFICANT CHANGE UP (ref 80–100)
METHOD TYPE: SIGNIFICANT CHANGE UP
MONOCYTES # BLD AUTO: 0.23 K/UL — SIGNIFICANT CHANGE UP (ref 0–0.9)
MONOCYTES NFR BLD AUTO: 9.9 % — SIGNIFICANT CHANGE UP (ref 2–14)
NEUTROPHILS # BLD AUTO: 1.2 K/UL — LOW (ref 1.8–7.4)
NEUTROPHILS NFR BLD AUTO: 51.4 % — SIGNIFICANT CHANGE UP (ref 43–77)
NEUTS HYPERSEG # BLD: PRESENT — SIGNIFICANT CHANGE UP
ORGANISM # SPEC MICROSCOPIC CNT: SIGNIFICANT CHANGE UP
ORGANISM # SPEC MICROSCOPIC CNT: SIGNIFICANT CHANGE UP
PLAT MORPH BLD: NORMAL — SIGNIFICANT CHANGE UP
PLATELET # BLD AUTO: 250 K/UL — SIGNIFICANT CHANGE UP (ref 150–400)
POTASSIUM SERPL-MCNC: 3.9 MMOL/L — SIGNIFICANT CHANGE UP (ref 3.5–5.3)
POTASSIUM SERPL-SCNC: 3.9 MMOL/L — SIGNIFICANT CHANGE UP (ref 3.5–5.3)
PROT SERPL-MCNC: 5.2 G/DL — LOW (ref 6.6–8.7)
RBC # BLD: 3.48 M/UL — LOW (ref 3.8–5.2)
RBC # FLD: 13.4 % — SIGNIFICANT CHANGE UP (ref 10.3–14.5)
RBC BLD AUTO: NORMAL — SIGNIFICANT CHANGE UP
SMUDGE CELLS # BLD: PRESENT — SIGNIFICANT CHANGE UP
SODIUM SERPL-SCNC: 135 MMOL/L — SIGNIFICANT CHANGE UP (ref 135–145)
SPECIMEN SOURCE: SIGNIFICANT CHANGE UP
WBC # BLD: 2.33 K/UL — LOW (ref 3.8–10.5)
WBC # FLD AUTO: 2.33 K/UL — LOW (ref 3.8–10.5)

## 2022-09-14 PROCEDURE — 99233 SBSQ HOSP IP/OBS HIGH 50: CPT

## 2022-09-14 RX ORDER — HALOPERIDOL DECANOATE 100 MG/ML
5 INJECTION INTRAMUSCULAR EVERY 6 HOURS
Refills: 0 | Status: DISCONTINUED | OUTPATIENT
Start: 2022-09-14 | End: 2022-09-20

## 2022-09-14 RX ORDER — HALOPERIDOL DECANOATE 100 MG/ML
5 INJECTION INTRAMUSCULAR ONCE
Refills: 0 | Status: COMPLETED | OUTPATIENT
Start: 2022-09-14 | End: 2022-09-14

## 2022-09-14 RX ORDER — VALACYCLOVIR 500 MG/1
1000 TABLET, FILM COATED ORAL EVERY 12 HOURS
Refills: 0 | Status: ACTIVE | OUTPATIENT
Start: 2022-09-14 | End: 2022-09-17

## 2022-09-14 RX ORDER — VANCOMYCIN HCL 1 G
125 VIAL (EA) INTRAVENOUS EVERY 6 HOURS
Refills: 0 | Status: DISCONTINUED | OUTPATIENT
Start: 2022-09-14 | End: 2022-09-20

## 2022-09-14 RX ADMIN — Medication 100 MILLIGRAM(S): at 13:52

## 2022-09-14 RX ADMIN — Medication 200 MILLIGRAM(S): at 17:42

## 2022-09-14 RX ADMIN — Medication 200 MILLIGRAM(S): at 06:03

## 2022-09-14 RX ADMIN — Medication 0.5 MILLIGRAM(S): at 01:50

## 2022-09-14 RX ADMIN — Medication 650 MILLIGRAM(S): at 17:36

## 2022-09-14 RX ADMIN — Medication 125 MILLIGRAM(S): at 20:07

## 2022-09-14 RX ADMIN — Medication 100 MILLIGRAM(S): at 04:53

## 2022-09-14 RX ADMIN — Medication 650 MILLIGRAM(S): at 17:54

## 2022-09-14 RX ADMIN — Medication 0.5 MILLIGRAM(S): at 17:35

## 2022-09-14 RX ADMIN — Medication 650 MILLIGRAM(S): at 13:51

## 2022-09-14 RX ADMIN — SODIUM CHLORIDE 100 MILLILITER(S): 9 INJECTION, SOLUTION INTRAVENOUS at 17:45

## 2022-09-14 RX ADMIN — Medication 100 MILLIGRAM(S): at 21:27

## 2022-09-14 RX ADMIN — HALOPERIDOL DECANOATE 5 MILLIGRAM(S): 100 INJECTION INTRAMUSCULAR at 04:46

## 2022-09-14 RX ADMIN — Medication 1 MILLIGRAM(S): at 13:52

## 2022-09-14 RX ADMIN — Medication 125 MILLIGRAM(S): at 14:56

## 2022-09-14 RX ADMIN — SODIUM CHLORIDE 100 MILLILITER(S): 9 INJECTION, SOLUTION INTRAVENOUS at 21:22

## 2022-09-14 NOTE — PROGRESS NOTE ADULT - PROBLEM SELECTOR PLAN 1
jessica ischemic colitis  NO blood improving  hemoglobin stable  COnsider advancing to low fiber diet if abdominal tenderness improves  continue antibiotics  Stool studies pending

## 2022-09-14 NOTE — CHART NOTE - NSCHARTNOTEFT_GEN_A_CORE
Called by RN to evaluate rash on patient's R buttock.  Patient states that she occasionally gets outbreak in the same area and takes a medication starting with a V for it and it goes away.  Small herpetic rash on R buttock noted.  Valacyclovir 1000mg q12hrs x3 days ordered.  Continue to monitor patient, notify PA of any changes in patient status.

## 2022-09-15 PROCEDURE — 99233 SBSQ HOSP IP/OBS HIGH 50: CPT

## 2022-09-15 RX ORDER — ACETAMINOPHEN 500 MG
1000 TABLET ORAL ONCE
Refills: 0 | Status: COMPLETED | OUTPATIENT
Start: 2022-09-15 | End: 2022-09-15

## 2022-09-15 RX ORDER — OXYCODONE HYDROCHLORIDE 5 MG/1
5 TABLET ORAL ONCE
Refills: 0 | Status: DISCONTINUED | OUTPATIENT
Start: 2022-09-15 | End: 2022-09-15

## 2022-09-15 RX ORDER — LACTOBACILLUS ACIDOPHILUS 100MM CELL
1 CAPSULE ORAL DAILY
Refills: 0 | Status: DISCONTINUED | OUTPATIENT
Start: 2022-09-15 | End: 2022-09-20

## 2022-09-15 RX ORDER — OXYCODONE HYDROCHLORIDE 5 MG/1
5 TABLET ORAL EVERY 6 HOURS
Refills: 0 | Status: DISCONTINUED | OUTPATIENT
Start: 2022-09-15 | End: 2022-09-20

## 2022-09-15 RX ADMIN — Medication 0.5 MILLIGRAM(S): at 01:47

## 2022-09-15 RX ADMIN — OXYCODONE HYDROCHLORIDE 5 MILLIGRAM(S): 5 TABLET ORAL at 02:58

## 2022-09-15 RX ADMIN — Medication 125 MILLIGRAM(S): at 01:45

## 2022-09-15 RX ADMIN — Medication 400 MILLIGRAM(S): at 00:58

## 2022-09-15 RX ADMIN — OXYCODONE HYDROCHLORIDE 5 MILLIGRAM(S): 5 TABLET ORAL at 23:00

## 2022-09-15 RX ADMIN — Medication 100 MILLIGRAM(S): at 13:07

## 2022-09-15 RX ADMIN — Medication 1 TABLET(S): at 17:30

## 2022-09-15 RX ADMIN — VALACYCLOVIR 1000 MILLIGRAM(S): 500 TABLET, FILM COATED ORAL at 17:30

## 2022-09-15 RX ADMIN — Medication 650 MILLIGRAM(S): at 18:47

## 2022-09-15 RX ADMIN — Medication 200 MILLIGRAM(S): at 05:20

## 2022-09-15 RX ADMIN — SODIUM CHLORIDE 100 MILLILITER(S): 9 INJECTION, SOLUTION INTRAVENOUS at 10:22

## 2022-09-15 RX ADMIN — VALACYCLOVIR 1000 MILLIGRAM(S): 500 TABLET, FILM COATED ORAL at 05:21

## 2022-09-15 RX ADMIN — Medication 125 MILLIGRAM(S): at 13:07

## 2022-09-15 RX ADMIN — SODIUM CHLORIDE 100 MILLILITER(S): 9 INJECTION, SOLUTION INTRAVENOUS at 22:25

## 2022-09-15 RX ADMIN — LISINOPRIL 10 MILLIGRAM(S): 2.5 TABLET ORAL at 05:21

## 2022-09-15 RX ADMIN — VALACYCLOVIR 1000 MILLIGRAM(S): 500 TABLET, FILM COATED ORAL at 00:19

## 2022-09-15 RX ADMIN — Medication 650 MILLIGRAM(S): at 10:31

## 2022-09-15 RX ADMIN — OXYCODONE HYDROCHLORIDE 5 MILLIGRAM(S): 5 TABLET ORAL at 15:34

## 2022-09-15 RX ADMIN — OXYCODONE HYDROCHLORIDE 5 MILLIGRAM(S): 5 TABLET ORAL at 02:13

## 2022-09-15 RX ADMIN — OXYCODONE HYDROCHLORIDE 5 MILLIGRAM(S): 5 TABLET ORAL at 16:34

## 2022-09-15 RX ADMIN — Medication 650 MILLIGRAM(S): at 11:30

## 2022-09-15 RX ADMIN — Medication 125 MILLIGRAM(S): at 17:30

## 2022-09-15 RX ADMIN — Medication 125 MILLIGRAM(S): at 10:05

## 2022-09-15 RX ADMIN — OXYCODONE HYDROCHLORIDE 5 MILLIGRAM(S): 5 TABLET ORAL at 22:25

## 2022-09-15 RX ADMIN — Medication 1 MILLIGRAM(S): at 13:06

## 2022-09-15 RX ADMIN — Medication 1000 MILLIGRAM(S): at 01:13

## 2022-09-15 RX ADMIN — Medication 100 MILLIGRAM(S): at 05:20

## 2022-09-15 NOTE — DIETITIAN INITIAL EVALUATION ADULT - PERTINENT MEDS FT
MEDICATIONS  (STANDING):  ciprofloxacin   IVPB 400 milliGRAM(s) IV Intermittent every 12 hours  dextrose 5% + sodium chloride 0.9%. 1000 milliLiter(s) (100 mL/Hr) IV Continuous <Continuous>  folic acid 1 milliGRAM(s) Oral daily  lactobacillus acidophilus 1 Tablet(s) Oral daily  lisinopril 10 milliGRAM(s) Oral daily  metroNIDAZOLE  IVPB 500 milliGRAM(s) IV Intermittent every 8 hours  valACYclovir 1000 milliGRAM(s) Oral every 12 hours  vancomycin    Solution 125 milliGRAM(s) Oral every 6 hours    MEDICATIONS  (PRN):  acetaminophen     Tablet .. 650 milliGRAM(s) Oral every 6 hours PRN Mild Pain (1 - 3)  ALPRAZolam 0.5 milliGRAM(s) Oral two times a day PRN anxiety  haloperidol    Injectable 5 milliGRAM(s) IntraMuscular every 6 hours PRN Agitation  ondansetron Injectable 4 milliGRAM(s) IV Push every 6 hours PRN Nausea and/or Vomiting  oxyCODONE    IR 5 milliGRAM(s) Oral every 6 hours PRN Severe Pain (7 - 10)

## 2022-09-15 NOTE — DIETITIAN INITIAL EVALUATION ADULT - PERTINENT LABORATORY DATA
09-14    135  |  102  |  11.3  ----------------------------<  94  3.9   |  23.0  |  0.66    Ca    8.0<L>      14 Sep 2022 09:45    TPro  5.2<L>  /  Alb  2.5<L>  /  TBili  0.9  /  DBili  x   /  AST  24  /  ALT  17  /  AlkPhos  73  09-14

## 2022-09-15 NOTE — DIETITIAN INITIAL EVALUATION ADULT - OTHER INFO
78 yo female w PMHx RA, HTN, fibromyalgia presents complaining of diffuse abdominal pain. Per pt and her daughter, pt admitted to [facility] for constipation and abdominal pain, discharged 2 weeks ago with dx of UTI and pyelonephritis. Since discharge, pt has had continued diffuse abdominal pain that waxes and wanes through the day, relieved especially after sleeping through the night. Pt also endorses decreased appetite, nausea, and watery, malodorous diarrhea 3-4 times daily. Patient's daughter notes increasing confusion compared to normal baseline.

## 2022-09-15 NOTE — CHART NOTE - NSCHARTNOTEFT_GEN_A_CORE
RN called to notify pt with abd pain.    Pt assessed at bedside.   Pt states shes having abd pain that remains unchanged from prior episodes of pain. She states her pain is diffuse and vague. Does not radiate.   ROS: No chest pain, palpitations, SOB, light headedness, dizziness, headache, nausea/vomiting, fevers/chills.    HPI: 77 year old female hx of RA, HTN, fibromyalgia presents complaining of diffuse abdominal pain s/p hospital course treated for UTI at [facility]. Now with +PCR for CDiff Colitis, on isolation precautions.     Vital Signs Last 24 Hrs  T(C): 36.6 (14 Sep 2022 23:27), Max: 37.3 (14 Sep 2022 08:40)  T(F): 97.8 (14 Sep 2022 23:27), Max: 99.2 (14 Sep 2022 08:40)  HR: 79 (14 Sep 2022 23:27) (75 - 101)  BP: 122/66 (14 Sep 2022 23:27) (104/64 - 132/64)  RR: 18 (14 Sep 2022 23:27) (18 - 20)  SpO2: 97% (14 Sep 2022 23:27) (94% - 97%)  Parameters below as of 14 Sep 2022 23:27  Patient On (Oxygen Delivery Method): room air    GENERAL: NAD, appears uncomfortable   HEAD: Atraumatic, Normocephalic  EYES: conjunctiva and sclera clear  ENMT: Moist mucous membranes  NERVOUS SYSTEM: Alert and awake, Good concentration  CHEST/LUNG: Clear to auscultation b/l; Unlabored respirations  HEART: S1S2+, Regular rate  ABDOMEN: Soft, diffusely tender, Nondistended; BS+ [id number], no rebound or guarding present  EXTREMITIES:  2+ Peripheral Pulses, No LE edema, no tenderness to palpation of b/l LE  SKIN: Warm and dry    Plan  ordered 1x dose 1g ofirmev   continue to monitor  VSS at this time   RN to notify for acute changes in pt status RN called to notify pt with abd pain.    Pt assessed at bedside.   Pt states shes having abd pain that remains unchanged from prior episodes of pain. She states her pain is diffuse and vague. Does not radiate.   ROS: No chest pain, palpitations, SOB, light headedness, dizziness, headache, nausea/vomiting, fevers/chills.    HPI: 77 year old female hx of RA, HTN, fibromyalgia presents complaining of diffuse abdominal pain s/p hospital course treated for UTI at [facility]. Now with +PCR for CDiff Colitis, on isolation precautions.     Vital Signs Last 24 Hrs  T(C): 36.6 (14 Sep 2022 23:27), Max: 37.3 (14 Sep 2022 08:40)  T(F): 97.8 (14 Sep 2022 23:27), Max: 99.2 (14 Sep 2022 08:40)  HR: 79 (14 Sep 2022 23:27) (75 - 101)  BP: 122/66 (14 Sep 2022 23:27) (104/64 - 132/64)  RR: 18 (14 Sep 2022 23:27) (18 - 20)  SpO2: 97% (14 Sep 2022 23:27) (94% - 97%)  Parameters below as of 14 Sep 2022 23:27  Patient On (Oxygen Delivery Method): room air    GENERAL: NAD, appears uncomfortable   HEAD: Atraumatic, Normocephalic  EYES: conjunctiva and sclera clear  ENMT: Moist mucous membranes  NERVOUS SYSTEM: Alert and awake, Good concentration  CHEST/LUNG: Clear to auscultation b/l; Unlabored respirations  HEART: S1S2+, Regular rate  ABDOMEN: Soft, diffusely tender, Nondistended; BS+ [id number], no rebound or guarding present  EXTREMITIES:  2+ Peripheral Pulses, No LE edema, no tenderness to palpation of b/l LE  SKIN: Warm and dry    Plan  ordered 1x dose 1g ofirmev   continue to monitor  VSS at this time   RN to notify for acute changes in pt status    Addendum   Pt still with abd pain unrelieved from ofirmev.  Ordered 5mg Oxycodone PO x1 dose  Re-assessed pt, now with relief.   VSS  continue to monitor  RN to notify of acute changes in pt status

## 2022-09-15 NOTE — DIETITIAN INITIAL EVALUATION ADULT - NSFNSPHYEXAMSKINFT_GEN_A_CORE
Pressure Injury 1: none, Stage II  Pressure Injury 2: none, Stage II  Pressure Injury 3: none, Stage II  Pressure Injury 4: none, none  Pressure Injury 5: none, none  Pressure Injury 6: none, none  Pressure Injury 7: none, none  Pressure Injury 8: none, none  Pressure Injury 9: none, none  Pressure Injury 10: none, none  Pressure Injury 11: none, none, Pressure Injury 1: none, Stage II  Pressure Injury 2: none, Stage II  Pressure Injury 3: none, Stage II  Pressure Injury 4: none, none  Pressure Injury 5: none, none  Pressure Injury 6: none, none  Pressure Injury 7: none, none  Pressure Injury 8: none, none  Pressure Injury 9: none, none  Pressure Injury 10: none, none  Pressure Injury 11: none, none

## 2022-09-15 NOTE — DIETITIAN INITIAL EVALUATION ADULT - ORAL INTAKE PTA/DIET HISTORY
Pt with c diff with one episode this am. On clears with abd pain last night, less pain today. UBW reported as 160# with weight loss noted.

## 2022-09-16 LAB
CULTURE RESULTS: SIGNIFICANT CHANGE UP
SPECIMEN SOURCE: SIGNIFICANT CHANGE UP

## 2022-09-16 PROCEDURE — 99233 SBSQ HOSP IP/OBS HIGH 50: CPT

## 2022-09-16 RX ADMIN — LISINOPRIL 10 MILLIGRAM(S): 2.5 TABLET ORAL at 05:54

## 2022-09-16 RX ADMIN — SODIUM CHLORIDE 100 MILLILITER(S): 9 INJECTION, SOLUTION INTRAVENOUS at 08:54

## 2022-09-16 RX ADMIN — OXYCODONE HYDROCHLORIDE 5 MILLIGRAM(S): 5 TABLET ORAL at 23:20

## 2022-09-16 RX ADMIN — Medication 650 MILLIGRAM(S): at 20:15

## 2022-09-16 RX ADMIN — VALACYCLOVIR 1000 MILLIGRAM(S): 500 TABLET, FILM COATED ORAL at 16:53

## 2022-09-16 RX ADMIN — OXYCODONE HYDROCHLORIDE 5 MILLIGRAM(S): 5 TABLET ORAL at 16:54

## 2022-09-16 RX ADMIN — Medication 1 TABLET(S): at 14:04

## 2022-09-16 RX ADMIN — Medication 1 MILLIGRAM(S): at 14:04

## 2022-09-16 RX ADMIN — Medication 125 MILLIGRAM(S): at 02:55

## 2022-09-16 RX ADMIN — Medication 650 MILLIGRAM(S): at 02:56

## 2022-09-16 RX ADMIN — OXYCODONE HYDROCHLORIDE 5 MILLIGRAM(S): 5 TABLET ORAL at 08:53

## 2022-09-16 RX ADMIN — Medication 650 MILLIGRAM(S): at 03:40

## 2022-09-16 RX ADMIN — Medication 125 MILLIGRAM(S): at 08:55

## 2022-09-16 RX ADMIN — Medication 650 MILLIGRAM(S): at 20:45

## 2022-09-16 RX ADMIN — Medication 125 MILLIGRAM(S): at 19:33

## 2022-09-16 RX ADMIN — Medication 650 MILLIGRAM(S): at 14:04

## 2022-09-16 RX ADMIN — VALACYCLOVIR 1000 MILLIGRAM(S): 500 TABLET, FILM COATED ORAL at 05:54

## 2022-09-16 RX ADMIN — Medication 125 MILLIGRAM(S): at 14:05

## 2022-09-16 RX ADMIN — SODIUM CHLORIDE 100 MILLILITER(S): 9 INJECTION, SOLUTION INTRAVENOUS at 14:05

## 2022-09-16 NOTE — PHYSICAL THERAPY INITIAL EVALUATION ADULT - PERTINENT HX OF CURRENT PROBLEM, REHAB EVAL
Pt presents to [facility] with reports of worsening abdominal pain

## 2022-09-16 NOTE — PHYSICAL THERAPY INITIAL EVALUATION ADULT - ASSISTIVE DEVICE FOR TRANSFER: STAND/SIT, REHAB EVAL
Problem: Depressive Behavior With or Without Suicide Precautions:  Goal: Able to verbalize and/or display a decrease in depressive symptoms  Description: Able to verbalize and/or display a decrease in depressive symptoms  10/16/2021 1809 by Leopoldo Billy RN  Outcome: Ongoing     Problem: Depressive Behavior With or Without Suicide Precautions:  Goal: Ability to disclose and discuss suicidal ideas will improve  Description: Ability to disclose and discuss suicidal ideas will improve  10/16/2021 1809 by Leopoldo Billy RN  Outcome: Ongoing   Pt denies si/hi and hallucinations. Pt out on the unit and is social with peers, pt takes med's and attends groups. rolling walker

## 2022-09-16 NOTE — PHYSICAL THERAPY INITIAL EVALUATION ADULT - ADDITIONAL COMMENTS
per patient she is alone, owns a RW however ambulates mostly without an AD. Pt has ~2STE with a handrail and states family lives close but cannot stay with her 24/7.

## 2022-09-17 ENCOUNTER — TRANSCRIPTION ENCOUNTER (OUTPATIENT)
Age: 77
End: 2022-09-17

## 2022-09-17 LAB
ALBUMIN SERPL ELPH-MCNC: 2.5 G/DL — LOW (ref 3.3–5.2)
ALP SERPL-CCNC: 81 U/L — SIGNIFICANT CHANGE UP (ref 40–120)
ALT FLD-CCNC: 13 U/L — SIGNIFICANT CHANGE UP
ANION GAP SERPL CALC-SCNC: 12 MMOL/L — SIGNIFICANT CHANGE UP (ref 5–17)
AST SERPL-CCNC: 24 U/L — SIGNIFICANT CHANGE UP
BILIRUB SERPL-MCNC: 0.5 MG/DL — SIGNIFICANT CHANGE UP (ref 0.4–2)
BUN SERPL-MCNC: 4.6 MG/DL — LOW (ref 8–20)
CALCIUM SERPL-MCNC: 8.5 MG/DL — SIGNIFICANT CHANGE UP (ref 8.4–10.5)
CHLORIDE SERPL-SCNC: 101 MMOL/L — SIGNIFICANT CHANGE UP (ref 98–107)
CO2 SERPL-SCNC: 23 MMOL/L — SIGNIFICANT CHANGE UP (ref 22–29)
CREAT SERPL-MCNC: 0.69 MG/DL — SIGNIFICANT CHANGE UP (ref 0.5–1.3)
EGFR: 88 ML/MIN/1.73M2 — SIGNIFICANT CHANGE UP
GLUCOSE SERPL-MCNC: 92 MG/DL — SIGNIFICANT CHANGE UP (ref 70–99)
HCT VFR BLD CALC: 33.7 % — LOW (ref 34.5–45)
HGB BLD-MCNC: 11 G/DL — LOW (ref 11.5–15.5)
MCHC RBC-ENTMCNC: 30.9 PG — SIGNIFICANT CHANGE UP (ref 27–34)
MCHC RBC-ENTMCNC: 32.6 GM/DL — SIGNIFICANT CHANGE UP (ref 32–36)
MCV RBC AUTO: 94.7 FL — SIGNIFICANT CHANGE UP (ref 80–100)
PLATELET # BLD AUTO: 507 K/UL — HIGH (ref 150–400)
POTASSIUM SERPL-MCNC: 3.1 MMOL/L — LOW (ref 3.5–5.3)
POTASSIUM SERPL-SCNC: 3.1 MMOL/L — LOW (ref 3.5–5.3)
PROT SERPL-MCNC: 5.5 G/DL — LOW (ref 6.6–8.7)
RBC # BLD: 3.56 M/UL — LOW (ref 3.8–5.2)
RBC # FLD: 14.1 % — SIGNIFICANT CHANGE UP (ref 10.3–14.5)
SODIUM SERPL-SCNC: 136 MMOL/L — SIGNIFICANT CHANGE UP (ref 135–145)
WBC # BLD: 4.23 K/UL — SIGNIFICANT CHANGE UP (ref 3.8–10.5)
WBC # FLD AUTO: 4.23 K/UL — SIGNIFICANT CHANGE UP (ref 3.8–10.5)

## 2022-09-17 PROCEDURE — 99232 SBSQ HOSP IP/OBS MODERATE 35: CPT

## 2022-09-17 RX ORDER — DICLOFENAC SODIUM 75 MG/1
1 TABLET, DELAYED RELEASE ORAL
Qty: 0 | Refills: 0 | DISCHARGE

## 2022-09-17 RX ORDER — MORPHINE SULFATE 50 MG/1
2 CAPSULE, EXTENDED RELEASE ORAL ONCE
Refills: 0 | Status: DISCONTINUED | OUTPATIENT
Start: 2022-09-17 | End: 2022-09-17

## 2022-09-17 RX ORDER — VALACYCLOVIR 500 MG/1
1 TABLET, FILM COATED ORAL
Qty: 0 | Refills: 0 | DISCHARGE
Start: 2022-09-17

## 2022-09-17 RX ORDER — CEFPODOXIME PROXETIL 100 MG
1 TABLET ORAL
Qty: 0 | Refills: 0 | DISCHARGE

## 2022-09-17 RX ORDER — ACETAMINOPHEN 500 MG
2 TABLET ORAL
Qty: 0 | Refills: 0 | DISCHARGE

## 2022-09-17 RX ORDER — VANCOMYCIN HCL 1 G
1 VIAL (EA) INTRAVENOUS
Qty: 44 | Refills: 0
Start: 2022-09-17 | End: 2022-09-27

## 2022-09-17 RX ADMIN — OXYCODONE HYDROCHLORIDE 5 MILLIGRAM(S): 5 TABLET ORAL at 13:22

## 2022-09-17 RX ADMIN — LISINOPRIL 10 MILLIGRAM(S): 2.5 TABLET ORAL at 05:18

## 2022-09-17 RX ADMIN — VALACYCLOVIR 1000 MILLIGRAM(S): 500 TABLET, FILM COATED ORAL at 05:18

## 2022-09-17 RX ADMIN — Medication 125 MILLIGRAM(S): at 20:51

## 2022-09-17 RX ADMIN — Medication 650 MILLIGRAM(S): at 11:20

## 2022-09-17 RX ADMIN — Medication 650 MILLIGRAM(S): at 10:19

## 2022-09-17 RX ADMIN — Medication 1 TABLET(S): at 13:26

## 2022-09-17 RX ADMIN — Medication 650 MILLIGRAM(S): at 21:51

## 2022-09-17 RX ADMIN — Medication 1 MILLIGRAM(S): at 13:26

## 2022-09-17 RX ADMIN — OXYCODONE HYDROCHLORIDE 5 MILLIGRAM(S): 5 TABLET ORAL at 15:26

## 2022-09-17 RX ADMIN — MORPHINE SULFATE 2 MILLIGRAM(S): 50 CAPSULE, EXTENDED RELEASE ORAL at 02:30

## 2022-09-17 RX ADMIN — Medication 125 MILLIGRAM(S): at 13:26

## 2022-09-17 RX ADMIN — Medication 650 MILLIGRAM(S): at 20:51

## 2022-09-17 RX ADMIN — OXYCODONE HYDROCHLORIDE 5 MILLIGRAM(S): 5 TABLET ORAL at 06:52

## 2022-09-17 RX ADMIN — Medication 125 MILLIGRAM(S): at 02:31

## 2022-09-17 RX ADMIN — Medication 125 MILLIGRAM(S): at 10:19

## 2022-09-17 RX ADMIN — OXYCODONE HYDROCHLORIDE 5 MILLIGRAM(S): 5 TABLET ORAL at 18:52

## 2022-09-17 RX ADMIN — MORPHINE SULFATE 2 MILLIGRAM(S): 50 CAPSULE, EXTENDED RELEASE ORAL at 03:00

## 2022-09-17 RX ADMIN — Medication 0.5 MILLIGRAM(S): at 20:51

## 2022-09-17 NOTE — DISCHARGE NOTE PROVIDER - NSDCMRMEDTOKEN_GEN_ALL_CORE_FT
ALPRAZolam 0.5 mg oral tablet: 1 tab(s) orally 2 times a day, As Needed  biotin 5000 mcg oral capsule: 1 cap(s) orally once a day  cyanocobalamin 5000 mcg sublingual tablet: 1 tab(s) sublingual once a day  folic acid 1 mg oral tablet: 1 tab(s) orally once a day  methotrexate 2.5 mg oral tablet: 6 tab(s) orally once a week on fridays  ramipril 2.5 mg oral capsule: 1 cap(s) orally once a day  triamcinolone 0.1% topical cream: Apply topically to affected area 2 times a day, As Needed  vancomycin 125 mg oral capsule: 1 cap(s) orally every 6 hours   Vascepa 1 g oral capsule: 2 cap(s) orally 2 times a day  Vitamin D2 1.25 mg (50,000 intl units) oral capsule: 1 cap(s) orally 2 times a week   ALPRAZolam 0.5 mg oral tablet: 1 tab(s) orally 2 times a day, As Needed  biotin 5000 mcg oral capsule: 1 cap(s) orally once a day  cyanocobalamin 5000 mcg sublingual tablet: 1 tab(s) sublingual once a day  folic acid 1 mg oral tablet: 1 tab(s) orally once a day  methotrexate 2.5 mg oral tablet: 8 tab(s) orally every 7 days  on fridays  ramipril 2.5 mg oral capsule: 1 cap(s) orally once a day  triamcinolone 0.1% topical cream: Apply topically to affected area 2 times a day, As Needed  vancomycin 125 mg oral capsule: 1 cap(s) orally every 6 hours   8 more days  Vascepa 1 g oral capsule: 2 cap(s) orally 2 times a day  Vitamin D2 1.25 mg (50,000 intl units) oral capsule: 1 cap(s) orally 2 times a week

## 2022-09-17 NOTE — DISCHARGE NOTE PROVIDER - ATTENDING DISCHARGE PHYSICAL EXAMINATION:
GENERAL: NAD,   HEAD:  Atraumatic, Normocephalic  EYES: EOMI, PERRL, conjunctiva and sclera clear  ENMT:  Moist mucous membranes,  No lesions  NECK: Supple, No JVD, Normal thyroid  NERVOUS SYSTEM:  Alert & Oriented X3,  Moves upper and lower extremities; CNS-II-XII  CHEST/LUNG: Clear to auscultation bilaterally; No rales, rhonchi, wheezing,   HEART: Regular rate and rhythm; No murmurs,   ABDOMEN: Soft, Nontender, Nondistended; Bowel sounds present  EXTREMITIES:  Peripheral Pulses, No  cyanosis, or edema  SKIN: No rashes or lesions  psychiatry- mood and affect appropriate

## 2022-09-17 NOTE — DISCHARGE NOTE PROVIDER - HOSPITAL COURSE
77 year old female -RA, HTN, fibromyalgia presents complaining of diffuse abdominal pain. Per pt and her daughter, pt admitted to [facility] for constipation and abdominal pain, discharged 2 weeks prior with dx of UTI and pyelonephritis. Since discharge, pt has had continued diffuse abdominal pain      C diff Colitis-cont PO vanco   recent UTI on antibiotics.    clear liquid- discussed with GI   CTA abdomen done and results reviewed   C-diff PCR positive   po vanco  elevated TSH- repeat outpatient in 4 weeks - not on synthroid ever     HTN  home ramipril     RA and Fibromyalgia   On weekly methotrexate, last dose on Friday - hold this week with c diff colitis   c/w with Supplements     time spent ond c 34 minutes

## 2022-09-17 NOTE — DISCHARGE NOTE PROVIDER - NSDCCPCAREPLAN_GEN_ALL_CORE_FT
PRINCIPAL DISCHARGE DIAGNOSIS  Diagnosis: C. difficile diarrhea  Assessment and Plan of Treatment:       SECONDARY DISCHARGE DIAGNOSES  Diagnosis: Hypertension  Assessment and Plan of Treatment:

## 2022-09-17 NOTE — CHART NOTE - NSCHARTNOTEFT_GEN_A_CORE
Notified by RN pt c/o abd pain, pt received PRN oxy 10mg PO @ approximately 23:20 without relief of pain.   Pt admitted with colitis found to have CDiff-on Vanco PO.   Patient seen at bedside awake sitting on bedside commode having a BM, A&O x3.   Patient reports abd pain 8/10, generalized cramping sensation. Pt stated "the pain is not as bad as when I first came in but I'm in pain." Patient denies N/V, CP, SOB, fever, chills.  BM without melena or bright red blood.   Abd: soft, mild tenderness upon palpation LUQ and LLQ. No rebound tenderness, no guarding. Abd Non-distended. + bowel sounds x4 quads.  VSS. Ordered morphine 2mg IVP x1 dose for breakthrough pain, reassess for relief.  RN instructed to continue to monitor pt and notify provider of any changes in pt status.  PMD will be notified in AM.    03:30 Pt sleeping comfortably, NAD.

## 2022-09-18 LAB
ALBUMIN SERPL ELPH-MCNC: 2.7 G/DL — LOW (ref 3.3–5.2)
ALP SERPL-CCNC: 83 U/L — SIGNIFICANT CHANGE UP (ref 40–120)
ALT FLD-CCNC: 12 U/L — SIGNIFICANT CHANGE UP
ANION GAP SERPL CALC-SCNC: 11 MMOL/L — SIGNIFICANT CHANGE UP (ref 5–17)
AST SERPL-CCNC: 20 U/L — SIGNIFICANT CHANGE UP
BASOPHILS # BLD AUTO: 0.04 K/UL — SIGNIFICANT CHANGE UP (ref 0–0.2)
BASOPHILS NFR BLD AUTO: 0.9 % — SIGNIFICANT CHANGE UP (ref 0–2)
BILIRUB SERPL-MCNC: 0.5 MG/DL — SIGNIFICANT CHANGE UP (ref 0.4–2)
BUN SERPL-MCNC: 4.1 MG/DL — LOW (ref 8–20)
CALCIUM SERPL-MCNC: 8.5 MG/DL — SIGNIFICANT CHANGE UP (ref 8.4–10.5)
CHLORIDE SERPL-SCNC: 102 MMOL/L — SIGNIFICANT CHANGE UP (ref 98–107)
CO2 SERPL-SCNC: 25 MMOL/L — SIGNIFICANT CHANGE UP (ref 22–29)
CREAT SERPL-MCNC: 0.64 MG/DL — SIGNIFICANT CHANGE UP (ref 0.5–1.3)
EGFR: 90 ML/MIN/1.73M2 — SIGNIFICANT CHANGE UP
EOSINOPHIL # BLD AUTO: 0.48 K/UL — SIGNIFICANT CHANGE UP (ref 0–0.5)
EOSINOPHIL NFR BLD AUTO: 11.4 % — HIGH (ref 0–6)
GLUCOSE SERPL-MCNC: 79 MG/DL — SIGNIFICANT CHANGE UP (ref 70–99)
HCT VFR BLD CALC: 33.1 % — LOW (ref 34.5–45)
HGB BLD-MCNC: 11.1 G/DL — LOW (ref 11.5–15.5)
LYMPHOCYTES # BLD AUTO: 1.48 K/UL — SIGNIFICANT CHANGE UP (ref 1–3.3)
LYMPHOCYTES # BLD AUTO: 35.1 % — SIGNIFICANT CHANGE UP (ref 13–44)
MAGNESIUM SERPL-MCNC: 1.4 MG/DL — LOW (ref 1.8–2.6)
MCHC RBC-ENTMCNC: 31.5 PG — SIGNIFICANT CHANGE UP (ref 27–34)
MCHC RBC-ENTMCNC: 33.5 GM/DL — SIGNIFICANT CHANGE UP (ref 32–36)
MCV RBC AUTO: 94 FL — SIGNIFICANT CHANGE UP (ref 80–100)
MONOCYTES # BLD AUTO: 0.74 K/UL — SIGNIFICANT CHANGE UP (ref 0–0.9)
MONOCYTES NFR BLD AUTO: 17.5 % — HIGH (ref 2–14)
NEUTROPHILS # BLD AUTO: 1.37 K/UL — LOW (ref 1.8–7.4)
NEUTROPHILS NFR BLD AUTO: 32.5 % — LOW (ref 43–77)
PLATELET # BLD AUTO: 616 K/UL — HIGH (ref 150–400)
POTASSIUM SERPL-MCNC: 3.4 MMOL/L — LOW (ref 3.5–5.3)
POTASSIUM SERPL-SCNC: 3.4 MMOL/L — LOW (ref 3.5–5.3)
PROT SERPL-MCNC: 5.7 G/DL — LOW (ref 6.6–8.7)
RBC # BLD: 3.52 M/UL — LOW (ref 3.8–5.2)
RBC # FLD: 14.1 % — SIGNIFICANT CHANGE UP (ref 10.3–14.5)
SARS-COV-2 RNA SPEC QL NAA+PROBE: SIGNIFICANT CHANGE UP
SODIUM SERPL-SCNC: 138 MMOL/L — SIGNIFICANT CHANGE UP (ref 135–145)
WBC # BLD: 4.08 K/UL — SIGNIFICANT CHANGE UP (ref 3.8–10.5)
WBC # FLD AUTO: 4.08 K/UL — SIGNIFICANT CHANGE UP (ref 3.8–10.5)

## 2022-09-18 PROCEDURE — 99232 SBSQ HOSP IP/OBS MODERATE 35: CPT

## 2022-09-18 RX ORDER — MAGNESIUM SULFATE 500 MG/ML
2 VIAL (ML) INJECTION ONCE
Refills: 0 | Status: COMPLETED | OUTPATIENT
Start: 2022-09-18 | End: 2022-09-18

## 2022-09-18 RX ORDER — KETOROLAC TROMETHAMINE 30 MG/ML
15 SYRINGE (ML) INJECTION ONCE
Refills: 0 | Status: DISCONTINUED | OUTPATIENT
Start: 2022-09-18 | End: 2022-09-18

## 2022-09-18 RX ORDER — METHOTREXATE 2.5 MG/1
20 TABLET ORAL ONCE
Refills: 0 | Status: COMPLETED | OUTPATIENT
Start: 2022-09-18 | End: 2022-09-18

## 2022-09-18 RX ORDER — POTASSIUM CHLORIDE 20 MEQ
40 PACKET (EA) ORAL ONCE
Refills: 0 | Status: DISCONTINUED | OUTPATIENT
Start: 2022-09-18 | End: 2022-09-18

## 2022-09-18 RX ORDER — METHOTREXATE 2.5 MG/1
8 TABLET ORAL
Qty: 0 | Refills: 0 | DISCHARGE

## 2022-09-18 RX ORDER — METHOTREXATE 2.5 MG/1
8 TABLET ORAL ONCE
Refills: 0 | Status: DISCONTINUED | OUTPATIENT
Start: 2022-09-18 | End: 2022-09-18

## 2022-09-18 RX ORDER — POTASSIUM CHLORIDE 20 MEQ
40 PACKET (EA) ORAL ONCE
Refills: 0 | Status: COMPLETED | OUTPATIENT
Start: 2022-09-18 | End: 2022-09-18

## 2022-09-18 RX ADMIN — Medication 15 MILLIGRAM(S): at 17:39

## 2022-09-18 RX ADMIN — Medication 125 MILLIGRAM(S): at 08:50

## 2022-09-18 RX ADMIN — LISINOPRIL 10 MILLIGRAM(S): 2.5 TABLET ORAL at 05:16

## 2022-09-18 RX ADMIN — Medication 40 MILLIEQUIVALENT(S): at 11:59

## 2022-09-18 RX ADMIN — Medication 15 MILLIGRAM(S): at 15:27

## 2022-09-18 RX ADMIN — OXYCODONE HYDROCHLORIDE 5 MILLIGRAM(S): 5 TABLET ORAL at 11:58

## 2022-09-18 RX ADMIN — Medication 25 GRAM(S): at 11:59

## 2022-09-18 RX ADMIN — Medication 1 TABLET(S): at 11:57

## 2022-09-18 RX ADMIN — Medication 125 MILLIGRAM(S): at 21:03

## 2022-09-18 RX ADMIN — Medication 0.5 MILLIGRAM(S): at 21:02

## 2022-09-18 RX ADMIN — OXYCODONE HYDROCHLORIDE 5 MILLIGRAM(S): 5 TABLET ORAL at 21:02

## 2022-09-18 RX ADMIN — OXYCODONE HYDROCHLORIDE 5 MILLIGRAM(S): 5 TABLET ORAL at 05:45

## 2022-09-18 RX ADMIN — OXYCODONE HYDROCHLORIDE 5 MILLIGRAM(S): 5 TABLET ORAL at 05:16

## 2022-09-18 RX ADMIN — Medication 125 MILLIGRAM(S): at 01:27

## 2022-09-18 RX ADMIN — Medication 125 MILLIGRAM(S): at 15:20

## 2022-09-18 RX ADMIN — OXYCODONE HYDROCHLORIDE 5 MILLIGRAM(S): 5 TABLET ORAL at 22:00

## 2022-09-18 RX ADMIN — METHOTREXATE 20 MILLIGRAM(S): 2.5 TABLET ORAL at 17:20

## 2022-09-18 RX ADMIN — Medication 1 MILLIGRAM(S): at 11:58

## 2022-09-19 LAB
ALBUMIN SERPL ELPH-MCNC: 2.4 G/DL — LOW (ref 3.3–5.2)
ALP SERPL-CCNC: 66 U/L — SIGNIFICANT CHANGE UP (ref 40–120)
ALT FLD-CCNC: 9 U/L — SIGNIFICANT CHANGE UP
ANION GAP SERPL CALC-SCNC: 9 MMOL/L — SIGNIFICANT CHANGE UP (ref 5–17)
ANISOCYTOSIS BLD QL: SLIGHT — SIGNIFICANT CHANGE UP
AST SERPL-CCNC: 15 U/L — SIGNIFICANT CHANGE UP
BASOPHILS # BLD AUTO: 0.14 K/UL — SIGNIFICANT CHANGE UP (ref 0–0.2)
BASOPHILS NFR BLD AUTO: 3 % — HIGH (ref 0–2)
BILIRUB SERPL-MCNC: 0.3 MG/DL — LOW (ref 0.4–2)
BLASTS # FLD: 2 % — HIGH (ref 0–0)
BUN SERPL-MCNC: 4.8 MG/DL — LOW (ref 8–20)
CALCIUM SERPL-MCNC: 7.8 MG/DL — LOW (ref 8.4–10.5)
CHLORIDE SERPL-SCNC: 100 MMOL/L — SIGNIFICANT CHANGE UP (ref 98–107)
CO2 SERPL-SCNC: 27 MMOL/L — SIGNIFICANT CHANGE UP (ref 22–29)
CREAT SERPL-MCNC: 0.64 MG/DL — SIGNIFICANT CHANGE UP (ref 0.5–1.3)
EGFR: 90 ML/MIN/1.73M2 — SIGNIFICANT CHANGE UP
EOSINOPHIL # BLD AUTO: 0.33 K/UL — SIGNIFICANT CHANGE UP (ref 0–0.5)
EOSINOPHIL NFR BLD AUTO: 7 % — HIGH (ref 0–6)
GIANT PLATELETS BLD QL SMEAR: PRESENT — SIGNIFICANT CHANGE UP
GLUCOSE SERPL-MCNC: 81 MG/DL — SIGNIFICANT CHANGE UP (ref 70–99)
HCT VFR BLD CALC: 29.1 % — LOW (ref 34.5–45)
HGB BLD-MCNC: 9.5 G/DL — LOW (ref 11.5–15.5)
HYPOCHROMIA BLD QL: SLIGHT — SIGNIFICANT CHANGE UP
LYMPHOCYTES # BLD AUTO: 1.89 K/UL — SIGNIFICANT CHANGE UP (ref 1–3.3)
LYMPHOCYTES # BLD AUTO: 40 % — SIGNIFICANT CHANGE UP (ref 13–44)
MAGNESIUM SERPL-MCNC: 1.7 MG/DL — SIGNIFICANT CHANGE UP (ref 1.6–2.6)
MANUAL SMEAR VERIFICATION: SIGNIFICANT CHANGE UP
MCHC RBC-ENTMCNC: 30.9 PG — SIGNIFICANT CHANGE UP (ref 27–34)
MCHC RBC-ENTMCNC: 32.6 GM/DL — SIGNIFICANT CHANGE UP (ref 32–36)
MCV RBC AUTO: 94.8 FL — SIGNIFICANT CHANGE UP (ref 80–100)
METAMYELOCYTES # FLD: 1 % — HIGH (ref 0–0)
MONOCYTES # BLD AUTO: 0.71 K/UL — SIGNIFICANT CHANGE UP (ref 0–0.9)
MONOCYTES NFR BLD AUTO: 15 % — HIGH (ref 2–14)
MYELOCYTES NFR BLD: 1 % — HIGH (ref 0–0)
NEUTROPHILS # BLD AUTO: 1.37 K/UL — LOW (ref 1.8–7.4)
NEUTROPHILS NFR BLD AUTO: 28 % — LOW (ref 43–77)
NEUTS BAND # BLD: 1 % — SIGNIFICANT CHANGE UP (ref 0–8)
NRBC # BLD: 0 /100 — SIGNIFICANT CHANGE UP (ref 0–0)
OVALOCYTES BLD QL SMEAR: SLIGHT — SIGNIFICANT CHANGE UP
PLAT MORPH BLD: NORMAL — SIGNIFICANT CHANGE UP
PLATELET # BLD AUTO: 656 K/UL — HIGH (ref 150–400)
POIKILOCYTOSIS BLD QL AUTO: SLIGHT — SIGNIFICANT CHANGE UP
POTASSIUM SERPL-MCNC: 3.3 MMOL/L — LOW (ref 3.5–5.3)
POTASSIUM SERPL-SCNC: 3.3 MMOL/L — LOW (ref 3.5–5.3)
PROT SERPL-MCNC: 4.8 G/DL — LOW (ref 6.6–8.7)
RBC # BLD: 3.07 M/UL — LOW (ref 3.8–5.2)
RBC # FLD: 14.6 % — HIGH (ref 10.3–14.5)
RBC BLD AUTO: ABNORMAL
SODIUM SERPL-SCNC: 136 MMOL/L — SIGNIFICANT CHANGE UP (ref 135–145)
VARIANT LYMPHS # BLD: 2 % — SIGNIFICANT CHANGE UP (ref 0–6)
WBC # BLD: 4.72 K/UL — SIGNIFICANT CHANGE UP (ref 3.8–10.5)
WBC # FLD AUTO: 4.72 K/UL — SIGNIFICANT CHANGE UP (ref 3.8–10.5)

## 2022-09-19 PROCEDURE — 99232 SBSQ HOSP IP/OBS MODERATE 35: CPT

## 2022-09-19 RX ORDER — VANCOMYCIN HCL 1 G
1 VIAL (EA) INTRAVENOUS
Qty: 32 | Refills: 0
Start: 2022-09-19 | End: 2022-09-26

## 2022-09-19 RX ADMIN — Medication 0.5 MILLIGRAM(S): at 21:03

## 2022-09-19 RX ADMIN — OXYCODONE HYDROCHLORIDE 5 MILLIGRAM(S): 5 TABLET ORAL at 10:12

## 2022-09-19 RX ADMIN — LISINOPRIL 10 MILLIGRAM(S): 2.5 TABLET ORAL at 05:32

## 2022-09-19 RX ADMIN — Medication 1 MILLIGRAM(S): at 08:35

## 2022-09-19 RX ADMIN — Medication 125 MILLIGRAM(S): at 14:18

## 2022-09-19 RX ADMIN — Medication 650 MILLIGRAM(S): at 13:46

## 2022-09-19 RX ADMIN — Medication 125 MILLIGRAM(S): at 02:16

## 2022-09-19 RX ADMIN — Medication 125 MILLIGRAM(S): at 21:03

## 2022-09-19 RX ADMIN — OXYCODONE HYDROCHLORIDE 5 MILLIGRAM(S): 5 TABLET ORAL at 09:12

## 2022-09-19 RX ADMIN — OXYCODONE HYDROCHLORIDE 5 MILLIGRAM(S): 5 TABLET ORAL at 17:27

## 2022-09-19 RX ADMIN — OXYCODONE HYDROCHLORIDE 5 MILLIGRAM(S): 5 TABLET ORAL at 18:27

## 2022-09-19 RX ADMIN — Medication 125 MILLIGRAM(S): at 08:35

## 2022-09-19 RX ADMIN — Medication 1 TABLET(S): at 08:35

## 2022-09-19 RX ADMIN — Medication 650 MILLIGRAM(S): at 12:46

## 2022-09-19 NOTE — PROGRESS NOTE ADULT - ASSESSMENT
77 year old female -RA, HTN, fibromyalgia presents complaining of diffuse abdominal pain. Per pt and her daughter, pt admitted to [facility] for constipation and abdominal pain, discharged 2 weeks ago with dx of UTI and pyelonephritis. Since discharge, pt has had continued diffuse abdominal pain that waxes and wanes through the day, relieved especially after sleeping through the night. Pt also endorses decreased appetite, nausea, and watery, malodorous diarrhea 3-4 times daily. Patient found to be positive for c dif and is improving     C diff Colitis-cont PO vanco  improving     HTN  c.w meds     RA and Fibromyalgia   On weekly methotrexate, last dose on Friday -   c/w with Supplements     Anxiety   c/w Xanax PRN     dispo - family wants jerry as per case management. 
77 year old female -RA, HTN, fibromyalgia presents complaining of diffuse abdominal pain. Per pt and her daughter, pt admitted to [facility] for constipation and abdominal pain, discharged 2 weeks ago with dx of UTI and pyelonephritis. Since discharge, pt has had continued diffuse abdominal pain that waxes and wanes through the day, relieved especially after sleeping through the night. Pt also endorses decreased appetite, nausea, and watery, malodorous diarrhea 3-4 times daily. Patient found to be positive for c dif and is improving     C diff Colitis-cont PO vanco  improving     HTN  c.w meds     RA and Fibromyalgia   On weekly methotrexate, will order stat dose now  - states she is on 20mg weekly   - one dose toradol ordered    Anxiety   c/w Xanax PRN     dispo - family wants jerry as per case management. 
77 year old female -RA, HTN, fibromyalgia presents complaining of diffuse abdominal pain. Per pt and her daughter, pt admitted to [facility] for constipation and abdominal pain, discharged 2 weeks ago with dx of UTI and pyelonephritis. Since discharge, pt has had continued diffuse abdominal pain that waxes and wanes through the day, relieved especially after sleeping through the night. Pt also endorses decreased appetite, nausea, and watery, malodorous diarrhea 3-4 times daily. Patient found to be positive for c dif and is improving     C diff Colitis-cont PO vanco  improving     HTN  c.w meds     RA and Fibromyalgia   On weekly methotrexate, will order stat dose now  - states she is on 20mg weekly     Anxiety   c/w Xanax PRN     dispo - family wants jerry as per case management.   
2
77 year old female -RA, HTN, fibromyalgia presents complaining of diffuse abdominal pain. Per pt and her daughter, pt admitted to [facility] for constipation and abdominal pain, discharged 2 weeks ago with dx of UTI and pyelonephritis. Since discharge, pt has had continued diffuse abdominal pain that waxes and wanes through the day, relieved especially after sleeping through the night. Pt also endorses decreased appetite, nausea, and watery, malodorous diarrhea 3-4 times daily. Patient's daughter notes increasing confusion compared to normal baseline.    1. C diff Colitis-cont PO vanco   recent UTI on antibiotics.    clear liquid- discussed with GI   CTA abdomen done and results reviewed   C-diff PCR positive   IV fluids   PO vanco  elevated TSH- repeat outpatient in 4 weeks - not on synthroid ever     2. HTN  Start Lisinopril 10 mg po daily instead of ramipril     3.  RA and Fibromyalgia   On weekly methotrexate, last dose on Friday - hold this week with c diff colitis   c/w with Supplements     4. Anxiety   c/w Xanax PRN     5. DVT prophylaxis: compression boots     6. Metabolic encephalopathy with  underlying dementia- UA negative   colitis with abdominal pain,  low fiber diet.  She is confused at time more in the evenings    
77 year old female with RA presented with abdominal pain and diarrhea after recently being treated for UTI. She was found to have left sided colitis on imaging with stool studies positive for c diff.     C diff colitis  Continue PO vancomycin, may discontinue IV cipro and flagyl  Advance diet as tolerated       Mild macrocytic anemia   TSH elevated   Consider checking B12 and folate  Occult positive stool likely related to colitis   Continue to monitor hemoglobin and maintain >7.0  Avoid nsaids     GI will sign off, please re-engage as needed 
77 year old female -RA, HTN, fibromyalgia presents complaining of diffuse abdominal pain. Per pt and her daughter, pt admitted to [facility] for constipation and abdominal pain, discharged 2 weeks ago with dx of UTI and pyelonephritis. Since discharge, pt has had continued diffuse abdominal pain that waxes and wanes through the day, relieved especially after sleeping through the night. Pt also endorses decreased appetite, nausea, and watery, malodorous diarrhea 3-4 times daily. Patient's daughter notes increasing confusion compared to normal baseline.    1. C diff Colitis- started PO vanco   recent UTI on antibiotics.    clear liquid- discussed with GI   CTA abdomen done and results reviewed   C-diff PCR positive   IV fluids   PO vanco    2. HTN  Start Lisinopril 10 mg po daily instead of ramipril     3.  RA and Fibromyalgia   On weekly methotrexate, last dose on Friday   c/w with Supplements     4. Anxiety   c/w Xanax PRN     5. DVT prophylaxis: compression boots     6. Metabolic encephalopathy with  underlying dementia- UA negative     colitis with abdominal pain,   We will start clear liquid diet.  She is confused.   
  77 year old female -RA, HTN, fibromyalgia presents complaining of diffuse abdominal pain. Per pt and her daughter, pt admitted to [facility] for constipation and abdominal pain, discharged 2 weeks ago with dx of UTI and pyelonephritis. Since discharge, pt has had continued diffuse abdominal pain that waxes and wanes through the day, relieved especially after sleeping through the night. Pt also endorses decreased appetite, nausea, and watery, malodorous diarrhea 3-4 times daily. Patient's daughter notes increasing confusion compared to normal baseline.    1. Colitis likely ischemic vs infectious   clear liquid- discussed with GI   Afebrile, NO WBC   CTA abdomen done and results reviewed   Send stool studies and C-diff PCR   IV fluids   Start Cipro and Metronidazole   Follow cultures     2. HTN  Start Lisinopril 10 mg po daily instead of ramipril     3. Hx of RA and Fibromyalgia   On weekly methotrexate, last dose on Friday   c/w with Supplements     4. Anxiety   c/w Xanax PRN     5. DVT prophylaxis: compression boots     metabolic encephalopathy with possible underlying dementia- UA negative   colitis with abdominal pain, recent UTI on antibiotics.  We will start clear liquid diet.  She is confused.  Rule out another urinary infection although the antibiotics Cipro and Flagyl will cover the UTI.
Patient with colitis with abdominal pain, recent UTI on antibiotics.  We will start clear liquid diet.  She is confused.  Rule out another urinary infection although the antibiotics Cipro and Flagyl will cover the UTI.  A little better as per the daughter.  Continue one-to-one as per the primary team.  Thank you
    77 year old female -RA, HTN, fibromyalgia presents complaining of diffuse abdominal pain. Per pt and her daughter, pt admitted to [facility] for constipation and abdominal pain, discharged 2 weeks ago with dx of UTI and pyelonephritis. Since discharge, pt has had continued diffuse abdominal pain that waxes and wanes through the day, relieved especially after sleeping through the night. Pt also endorses decreased appetite, nausea, and watery, malodorous diarrhea 3-4 times daily. Patient's daughter notes increasing confusion compared to normal baseline.    1. C diff Colitis- started PO vanco   recent UTI on antibiotics.    clear liquid- discussed with GI   CTA abdomen done and results reviewed   C-diff PCR positive   IV fluids   PO vanco  evelated TSH- repeat outpatient in 4 weeks - not on synthroid ever   2. HTN  Start Lisinopril 10 mg po daily instead of ramipril     3.  RA and Fibromyalgia   On weekly methotrexate, last dose on Friday - hold this week with c diff colitis   c/w with Supplements     4. Anxiety   c/w Xanax PRN     5. DVT prophylaxis: compression boots     6. Metabolic encephalopathy with  underlying dementia- UA negative   colitis with abdominal pain,  clear liquid diet.  She is confused.at time more in the evenings

## 2022-09-19 NOTE — PROGRESS NOTE ADULT - SUBJECTIVE AND OBJECTIVE BOX
SLAVA ADASMON Patient is a 77y old  Female who presents with a chief complaint of Colitis (14 Sep 2022 12:32)     HPI:  78 yo female w PMHx RA, HTN, fibromyalgia presents complaining of diffuse abdominal pain. Per pt and her daughter, pt admitted to [facility] for constipation and abdominal pain, discharged 2 weeks ago with dx of UTI and pyelonephritis. Since discharge, pt has had continued diffuse abdominal pain that waxes and wanes through the day, relieved especially after sleeping through the night. Pt also endorses decreased appetite, nausea, and watery, malodorous diarrhea 3-4 times daily. Patient's daughter notes increasing confusion compared to normal baseline.  Pt denies fevers, chills, headache, vomiting, chest pain, palpitations, shortness of breath, and dysuria.  (12 Sep 2022 13:48)    The patient was seen and evaluated sleepy- was confused earlier and agitated at times - offers no complaints of pain- doesn't want to eat - not hungry- is ok trying some food   The patient is in no acute distress.      Height (cm): 172.7 (09-13 @ 15:40)  Weight (kg): 72.6 (09-13 @ 15:40)  BMI (kg/m2): 24.3 (09-13 @ 15:40)  BSA (m2): 1.86 (09-13 @ 15:40)I&O's Summary    14 Sep 2022 07:01  -  14 Sep 2022 13:53  --------------------------------------------------------  IN: 120 mL / OUT: 500 mL / NET: -380 mL      Allergies    adhesives (Unknown)  latex (Blisters)  statins (Unknown)    Intolerances      HEALTH ISSUES - PROBLEM Dx:  Diarrhea          PAST MEDICAL & SURGICAL HISTORY:  Hypertension      Rheumatoid arthritis      Depressed      Fibromyalgia      H/O lithotripsy              Vital Signs Last 24 Hrs  T(C): 36.9 (14 Sep 2022 13:48), Max: 37.3 (14 Sep 2022 08:40)  T(F): 98.4 (14 Sep 2022 13:48), Max: 99.2 (14 Sep 2022 08:40)  HR: 93 (14 Sep 2022 13:48) (93 - 101)  BP: 104/64 (14 Sep 2022 13:48) (104/64 - 132/64)  BP(mean): --  RR: 20 (14 Sep 2022 13:48) (18 - 20)  SpO2: 94% (14 Sep 2022 13:48) (94% - 95%)    Parameters below as of 14 Sep 2022 13:48  Patient On (Oxygen Delivery Method): room air    T(C): 36.9 (09-14-22 @ 13:48), Max: 37.3 (09-14-22 @ 08:40)  HR: 93 (09-14-22 @ 13:48) (93 - 101)  BP: 104/64 (09-14-22 @ 13:48) (104/64 - 132/64)  RR: 20 (09-14-22 @ 13:48) (18 - 20)  SpO2: 94% (09-14-22 @ 13:48) (94% - 95%)  Wt(kg): --    PHYSICAL EXAM:    GENERAL: NAD, confused  HEAD:  Atraumatic, Normocephalic  EYES: EOMI, PERRL, conjunctiva and sclera clear  ENMT:  Moist mucous membranes,  No lesions  NECK: Supple, No JVD, Normal thyroid  NERVOUS SYSTEM:  in bed Moves upper and lower extremities; CNS-II-XII  CHEST/LUNG: Clear to auscultation bilaterally; No rales, rhonchi, wheezing,   HEART: Regular rate and rhythm; No murmurs,   ABDOMEN: Soft, Nontender, Nondistended; Bowel sounds present  EXTREMITIES:  Peripheral Pulses, No  cyanosis, or edema  SKIN: No rashes or lesions  psychiatry- unclear Insight and judgement intact     acetaminophen     Tablet .. 650 milliGRAM(s) Oral every 6 hours PRN  ALPRAZolam 0.5 milliGRAM(s) Oral two times a day PRN  ciprofloxacin   IVPB 400 milliGRAM(s) IV Intermittent every 12 hours  dextrose 5% + sodium chloride 0.9%. 1000 milliLiter(s) IV Continuous <Continuous>  folic acid 1 milliGRAM(s) Oral daily  haloperidol    Injectable 5 milliGRAM(s) IntraMuscular every 6 hours PRN  lisinopril 10 milliGRAM(s) Oral daily  metroNIDAZOLE  IVPB 500 milliGRAM(s) IV Intermittent every 8 hours  ondansetron Injectable 4 milliGRAM(s) IV Push every 6 hours PRN      LABS:                          10.7   2.33  )-----------( 250      ( 14 Sep 2022 09:45 )             32.4     09-14    135  |  102  |  11.3  ----------------------------<  94  3.9   |  23.0  |  0.66    Ca    8.0<L>      14 Sep 2022 09:45  Mg     1.8     09-13    TPro  5.2<L>  /  Alb  2.5<L>  /  TBili  0.9  /  DBili  x   /  AST  24  /  ALT  17  /  AlkPhos  73  09-14    LIVER FUNCTIONS - ( 14 Sep 2022 09:45 )  Alb: 2.5 g/dL / Pro: 5.2 g/dL / ALK PHOS: 73 U/L / ALT: 17 U/L / AST: 24 U/L / GGT: x                   CAPILLARY BLOOD GLUCOSE          RADIOLOGY & ADDITIONAL TESTS:      Consultant notes reviewed    Case discussed with consultant/provider/ family /patient 
Chief Complaint:  Patient is a 77y old  Female who presents with a chief complaint of Colitis (14 Sep 2022 12:32)      Interval Events / Subjective: Patient seen and examined at bedside. C diff positive. She was started on po vancomycin. She reports some improvement in abdominal soreness and diarrhea. She denies nausea, vomiting or abdominal pain. She was able to tolerate clears. She is unsure if she is ready to advance her diet at this time and will let the team know when she is up to it.       REVIEW OF SYSTEMS:   General: Negative  HEENT: Negative  CV: Negative  Respiratory: Negative  GI: See HPI  : Negative  MSK: Negative  Hematologic: Negative  Skin: Negative    MEDICATIONS:   MEDICATIONS  (STANDING):  ciprofloxacin   IVPB 400 milliGRAM(s) IV Intermittent every 12 hours  dextrose 5% + sodium chloride 0.9%. 1000 milliLiter(s) (100 mL/Hr) IV Continuous <Continuous>  folic acid 1 milliGRAM(s) Oral daily  lisinopril 10 milliGRAM(s) Oral daily  metroNIDAZOLE  IVPB 500 milliGRAM(s) IV Intermittent every 8 hours  valACYclovir 1000 milliGRAM(s) Oral every 12 hours  vancomycin    Solution 125 milliGRAM(s) Oral every 6 hours    MEDICATIONS  (PRN):  acetaminophen     Tablet .. 650 milliGRAM(s) Oral every 6 hours PRN Mild Pain (1 - 3)  ALPRAZolam 0.5 milliGRAM(s) Oral two times a day PRN anxiety  haloperidol    Injectable 5 milliGRAM(s) IntraMuscular every 6 hours PRN Agitation  ondansetron Injectable 4 milliGRAM(s) IV Push every 6 hours PRN Nausea and/or Vomiting      ALLERGIES:   Allergies    adhesives (Unknown)  latex (Blisters)  statins (Unknown)    Intolerances        VITAL SIGNS:   Vital Signs Last 24 Hrs  T(C): 36.5 (15 Sep 2022 05:19), Max: 37.3 (14 Sep 2022 08:40)  T(F): 97.7 (15 Sep 2022 05:19), Max: 99.2 (14 Sep 2022 08:40)  HR: 76 (15 Sep 2022 05:19) (75 - 95)  BP: 125/79 (15 Sep 2022 05:19) (101/52 - 125/79)  BP(mean): --  RR: 18 (15 Sep 2022 05:19) (18 - 20)  SpO2: 97% (15 Sep 2022 05:19) (93% - 97%)    Parameters below as of 15 Sep 2022 05:19  Patient On (Oxygen Delivery Method): room air      I&O's Summary    14 Sep 2022 07:01  -  15 Sep 2022 07:00  --------------------------------------------------------  IN: 1600 mL / OUT: 500 mL / NET: 1100 mL        PHYSICAL EXAM:   GENERAL:  No acute distress, pleasant  HEENT:  NC/AT,  conjunctivae clear, sclera -anicteric  CHEST:  Full & symmetric excursion, no increased effort, breath sounds clear  HEART:  Regular rhythm,   ABDOMEN:  Soft, non-tender, non-distended, normoactive bowel sounds,  no rebound or guarding  EXTREMITIES: No r edema  SKIN:  warm/dry  NEURO:  calm, cooperative    LABS:  CBC Full  -  ( 14 Sep 2022 09:45 )  WBC Count : 2.33 K/uL  RBC Count : 3.48 M/uL  Hemoglobin : 10.7 g/dL  Hematocrit : 32.4 %  Platelet Count - Automated : 250 K/uL  Mean Cell Volume : 93.1 fl  Mean Cell Hemoglobin : 30.7 pg  Mean Cell Hemoglobin Concentration : 33.0 gm/dL  Auto Neutrophil # : 1.20 K/uL  Auto Lymphocyte # : 0.78 K/uL  Auto Monocyte # : 0.23 K/uL  Auto Eosinophil # : 0.08 K/uL  Auto Basophil # : 0.04 K/uL  Auto Neutrophil % : 51.4 %  Auto Lymphocyte % : 33.3 %  Auto Monocyte % : 9.9 %  Auto Eosinophil % : 3.6 %  Auto Basophil % : 1.8 %    09-14    135  |  102  |  11.3  ----------------------------<  94  3.9   |  23.0  |  0.66    Ca    8.0<L>      14 Sep 2022 09:45    TPro  5.2<L>  /  Alb  2.5<L>  /  TBili  0.9  /  DBili  x   /  AST  24  /  ALT  17  /  AlkPhos  73  09-14    LIVER FUNCTIONS - ( 14 Sep 2022 09:45 )  Alb: 2.5 g/dL / Pro: 5.2 g/dL / ALK PHOS: 73 U/L / ALT: 17 U/L / AST: 24 U/L / GGT: x             Culture - Urine (collected 12 Sep 2022 09:30)  Source: Clean Catch Clean Catch (Midstream)  Final Report (14 Sep 2022 10:56):    50,000 - 99,000 CFU/mL Klebsiella aerogenes (Previously Enterobacter)    <10,000 CFU/ml Normal Urogenital katt present  Organism: Klebsiella aerogenes (Previously Enterobacter) (14 Sep 2022 10:56)  Organism: Klebsiella aerogenes (Previously Enterobacter) (14 Sep 2022 10:56)        RADIOLOGY & ADDITIONAL STUDIES (The following images were personally reviewed): CTAP left sided colitis     
INTERVAL HPI/OVERNIGHT EVENTS: Follow-up is being performed for colitis.  Patient is confused.  On antibiotics.  Daughter at bedside.  On antibiotics.  No abdominal pain.        MEDICATIONS  (STANDING):  ciprofloxacin   IVPB 400 milliGRAM(s) IV Intermittent every 12 hours  dextrose 5% + sodium chloride 0.9%. 1000 milliLiter(s) (100 mL/Hr) IV Continuous <Continuous>  folic acid 1 milliGRAM(s) Oral daily  lisinopril 10 milliGRAM(s) Oral daily  metroNIDAZOLE  IVPB 500 milliGRAM(s) IV Intermittent every 8 hours    MEDICATIONS  (PRN):  acetaminophen     Tablet .. 650 milliGRAM(s) Oral every 6 hours PRN Mild Pain (1 - 3)  ALPRAZolam 0.5 milliGRAM(s) Oral two times a day PRN anxiety      Allergies    adhesives (Unknown)  latex (Blisters)  statins (Unknown)    Intolerances        Vital Signs Last 24 Hrs  T(C): 37.7 (13 Sep 2022 11:50), Max: 37.7 (13 Sep 2022 11:50)  T(F): 99.8 (13 Sep 2022 11:50), Max: 99.8 (13 Sep 2022 11:50)  HR: 91 (13 Sep 2022 11:50) (66 - 104)  BP: 150/78 (13 Sep 2022 11:50) (150/78 - 169/88)  BP(mean): --  RR: 18 (13 Sep 2022 11:50) (18 - 18)  SpO2: 95% (13 Sep 2022 11:50) (95% - 99%)    Parameters below as of 13 Sep 2022 11:50  Patient On (Oxygen Delivery Method): room air        LABS:                        11.6   5.00  )-----------( 400      ( 13 Sep 2022 04:36 )             34.6     -    139  |  101  |  24.6<H>  ----------------------------<  76  4.4   |  26.0  |  0.89    Ca    8.8      13 Sep 2022 04:36  Mg     1.8         TPro  5.6<L>  /  Alb  2.6<L>  /  TBili  0.8  /  DBili  x   /  AST  53<H>  /  ALT  28  /  AlkPhos  99  12      Urinalysis Basic - ( 12 Sep 2022 09:30 )    Color: Yellow / Appearance: Clear / S.015 / pH: x  Gluc: x / Ketone: Small  / Bili: Negative / Urobili: Negative mg/dL   Blood: x / Protein: 15 / Nitrite: Negative   Leuk Esterase: Moderate / RBC: 3-5 /HPF / WBC 3-5 /HPF   Sq Epi: x / Non Sq Epi: Few / Bacteria: Few        RADIOLOGY & ADDITIONAL TESTS:  
Patient is a 77y old  Female who presents with a chief complaint of Colitis (13 Sep 2022 15:40)      HPI:  76 yo female w PMHx RA, HTN, fibromyalgia . Patient is  confused. SAys she has left lower abdominal pain. Can not describe. AS per aide at truong, pt with 2 loose stools this am. No blood.  Hgb stable. WBC normal .     REVIEW OF SYSTEMS:  Constitutional: No fever, weight loss or fatigue  ENMT:  No difficulty hearing, tinnitus, vertigo; No sinus or throat pain  Respiratory: No cough, wheezing, chills or hemoptysis  Cardiovascular: No chest pain, palpitations, dizziness or leg swelling  Gastrointestinal: No abdominal or epigastric pain. No nausea, vomiting or hematemesis; No diarrhea or constipation. No melena or hematochezia.  Skin: No itching, burning, rashes or lesions   Musculoskeletal: No joint pain or swelling; No muscle, back or extremity pain    PAST MEDICAL & SURGICAL HISTORY:  Hypertension      Rheumatoid arthritis      Depressed      Fibromyalgia      H/O lithotripsy          FAMILY HISTORY:      SOCIAL HISTORY:  Smoking Status: [ ] Current, [ ] Former, [ ] Never  Pack Years:  [  ] EtOH-no  [  ] IVDA    MEDICATIONS:  MEDICATIONS  (STANDING):  ciprofloxacin   IVPB 400 milliGRAM(s) IV Intermittent every 12 hours  dextrose 5% + sodium chloride 0.9%. 1000 milliLiter(s) (100 mL/Hr) IV Continuous <Continuous>  folic acid 1 milliGRAM(s) Oral daily  lisinopril 10 milliGRAM(s) Oral daily  metroNIDAZOLE  IVPB 500 milliGRAM(s) IV Intermittent every 8 hours    MEDICATIONS  (PRN):  acetaminophen     Tablet .. 650 milliGRAM(s) Oral every 6 hours PRN Mild Pain (1 - 3)  ALPRAZolam 0.5 milliGRAM(s) Oral two times a day PRN anxiety  haloperidol    Injectable 5 milliGRAM(s) IntraMuscular every 6 hours PRN Agitation  ondansetron Injectable 4 milliGRAM(s) IV Push every 6 hours PRN Nausea and/or Vomiting      Allergies    adhesives (Unknown)  latex (Blisters)  statins (Unknown)    Intolerances        Vital Signs Last 24 Hrs  T(C): 37.3 (14 Sep 2022 08:40), Max: 37.3 (14 Sep 2022 08:40)  T(F): 99.2 (14 Sep 2022 08:40), Max: 99.2 (14 Sep 2022 08:40)  HR: 95 (14 Sep 2022 08:40) (95 - 101)  BP: 116/66 (14 Sep 2022 08:40) (116/66 - 132/64)  BP(mean): --  RR: 19 (14 Sep 2022 08:40) (18 - 19)  SpO2: 95% (14 Sep 2022 08:40) (94% - 95%)    Parameters below as of 14 Sep 2022 08:40  Patient On (Oxygen Delivery Method): room air        09-14 @ 07:01  -  09-14 @ 12:33  --------------------------------------------------------  IN: 0 mL / OUT: 300 mL / NET: -300 mL          PHYSICAL EXAM:    General: ; in no acute distress  HEENT: MMM, conjunctiva and sclera clear  H-RRR  L-CTA  Gastrointestinal: Soft, + LLQ tenderness on palpation  non-distended; Normal bowel sounds; No rebound or guarding  Extremities: Normal range of motion, No clubbing, cyanosis or edema  Neurological: Alert and oriented x3  Skin: Warm and dry. No obvious rash      LABS:                        10.7   2.33  )-----------( 250      ( 14 Sep 2022 09:45 )             32.4     14 Sep 2022 09:45    135    |  102    |  11.3   ----------------------------<  94     3.9     |  23.0   |  0.66     Ca    8.0        14 Sep 2022 09:45  Mg     1.8       13 Sep 2022 04:36    TPro  5.2    /  Alb  2.5    /  TBili  0.9    /  DBili  x      /  AST  24     /  ALT  17     /  AlkPhos  73     / Amylase x      /Lipase x      14 Sep 2022 09:45              RADIOLOGY & ADDITIONAL STUDIES:     < from: CT Abdomen and Pelvis w/ IV Cont (09.12.22 @ 11:54) >  eft-sided colitis extending from the distal transverse through the   sigmoid colon. Findings are most likely due to ischemic colitis although   other etiologies could have a similar appearance.    VERTEBRAL BODY ANALYSIS: No Vertebral fracture or low bone density   identified.    < end of copied text >  
SLAVA ADAMSON    [id number]    77y      Female    INTERVAL HPI/OVERNIGHT EVENTS: patient being seen for c dif colitis. Patient seen at bedside and denies any complaints and wants to go home.     patient states diarrhea has improved    REVIEW OF SYSTEMS:    CONSTITUTIONAL: No fever, weight loss, or fatigue  RESPIRATORY: No cough, wheezing, hemoptysis; No shortness of breath  CARDIOVASCULAR: No chest pain, palpitations  GASTROINTESTINAL: No abdominal or epigastric pain. No nausea, vomiting  NEUROLOGICAL: No headaches, memory loss, loss of strength.  MISCELLANEOUS:      Vital Signs Last 24 Hrs  T(C): 36.5 (18 Sep 2022 04:54), Max: 37.2 (17 Sep 2022 11:52)  T(F): 97.7 (18 Sep 2022 04:54), Max: 99 (17 Sep 2022 11:52)  HR: 80 (18 Sep 2022 04:54) (80 - 102)  BP: 132/74 (18 Sep 2022 04:54) (113/62 - 145/78)  BP(mean): --  RR: 18 (18 Sep 2022 04:54) (18 - 18)  SpO2: 94% (18 Sep 2022 04:54) (94% - 96%)    Parameters below as of 18 Sep 2022 04:54  Patient On (Oxygen Delivery Method): room air        PHYSICAL EXAM:    GENERAL: NAD,   HEAD:  Atraumatic, Normocephalic  EYES: EOMI, PERRL, conjunctiva and sclera clear  NERVOUS SYSTEM:  Alert & Oriented X3,  Moves upper and lower extremities;   CHEST/LUNG: Clear to auscultation bilaterally; No rales, rhonchi, wheezing,   HEART: Regular rate and rhythm; No murmurs,   ABDOMEN: Soft, Nontender, Nondistended; Bowel sounds present  EXTREMITIES:  Peripheral Pulses, No  cyanosis, or edema  SKIN: No rashes or lesions  psychiatry- mood and affect appropriate      LABS:                        11.1   4.08  )-----------( 616      ( 18 Sep 2022 07:12 )             33.1     09-18    138  |  102  |  4.1<L>  ----------------------------<  79  3.4<L>   |  25.0  |  0.64    Ca    8.5      18 Sep 2022 07:12  Mg     1.4     09-18    TPro  5.7<L>  /  Alb  2.7<L>  /  TBili  0.5  /  DBili  x   /  AST  20  /  ALT  12  /  AlkPhos  83  09-18          MEDICATIONS  (STANDING):  folic acid 1 milliGRAM(s) Oral daily  lactobacillus acidophilus 1 Tablet(s) Oral daily  lisinopril 10 milliGRAM(s) Oral daily  magnesium sulfate  IVPB 2 Gram(s) IV Intermittent once  potassium chloride   Solution 40 milliEquivalent(s) Oral once  vancomycin    Solution 125 milliGRAM(s) Oral every 6 hours    MEDICATIONS  (PRN):  acetaminophen     Tablet .. 650 milliGRAM(s) Oral every 6 hours PRN Mild Pain (1 - 3)  ALPRAZolam 0.5 milliGRAM(s) Oral two times a day PRN anxiety  haloperidol    Injectable 5 milliGRAM(s) IntraMuscular every 6 hours PRN Agitation  ondansetron Injectable 4 milliGRAM(s) IV Push every 6 hours PRN Nausea and/or Vomiting  oxyCODONE    IR 5 milliGRAM(s) Oral every 6 hours PRN Severe Pain (7 - 10)      RADIOLOGY & ADDITIONAL TESTS:  
SLAVA ADAMSON    [id number]    77y      Female    INTERVAL HPI/OVERNIGHT EVENTS: patient being seen for c dif colitis. Patient seen at bedside and complains of joint pain. Patient missed her methotrexate dose on friday.     REVIEW OF SYSTEMS:    CONSTITUTIONAL:  joint pain   RESPIRATORY: No cough, wheezing, hemoptysis; No shortness of breath  CARDIOVASCULAR: No chest pain, palpitations  GASTROINTESTINAL: No abdominal or epigastric pain. No nausea, vomiting  NEUROLOGICAL: No headaches, memory loss, loss of strength.  MISCELLANEOUS:      Vital Signs Last 24 Hrs  T(C): 36.9 (18 Sep 2022 10:05), Max: 37.1 (17 Sep 2022 20:20)  T(F): 98.5 (18 Sep 2022 10:05), Max: 98.8 (17 Sep 2022 20:20)  HR: 89 (18 Sep 2022 10:05) (80 - 102)  BP: 146/82 (18 Sep 2022 10:05) (132/74 - 146/82)  BP(mean): --  RR: 17 (18 Sep 2022 10:05) (17 - 18)  SpO2: 95% (18 Sep 2022 10:05) (94% - 96%)    Parameters below as of 18 Sep 2022 10:05  Patient On (Oxygen Delivery Method): room air        PHYSICAL EXAM:  GENERAL: NAD,   HEAD:  Atraumatic, Normocephalic  EYES: EOMI, PERRL, conjunctiva and sclera clear  NERVOUS SYSTEM:  Alert & Oriented X3,  Moves upper and lower extremities;   CHEST/LUNG: Clear to auscultation bilaterally; No rales, rhonchi, wheezing,   HEART: Regular rate and rhythm; No murmurs,   ABDOMEN: Soft, Nontender, Nondistended; Bowel sounds present  EXTREMITIES:  Peripheral Pulses, No  cyanosis, or edema  SKIN: No rashes or lesions  psychiatry- mood and affect appropriate      LABS:                        11.1   4.08  )-----------( 616      ( 18 Sep 2022 07:12 )             33.1     09-18    138  |  102  |  4.1<L>  ----------------------------<  79  3.4<L>   |  25.0  |  0.64    Ca    8.5      18 Sep 2022 07:12  Mg     1.4     09-18    TPro  5.7<L>  /  Alb  2.7<L>  /  TBili  0.5  /  DBili  x   /  AST  20  /  ALT  12  /  AlkPhos  83  09-18            MEDICATIONS  (STANDING):  folic acid 1 milliGRAM(s) Oral daily  ketorolac   Injectable 15 milliGRAM(s) IV Push once  lactobacillus acidophilus 1 Tablet(s) Oral daily  lisinopril 10 milliGRAM(s) Oral daily  methotrexate 20 milliGRAM(s) Oral once  vancomycin    Solution 125 milliGRAM(s) Oral every 6 hours    MEDICATIONS  (PRN):  acetaminophen     Tablet .. 650 milliGRAM(s) Oral every 6 hours PRN Mild Pain (1 - 3)  ALPRAZolam 0.5 milliGRAM(s) Oral two times a day PRN anxiety  haloperidol    Injectable 5 milliGRAM(s) IntraMuscular every 6 hours PRN Agitation  ondansetron Injectable 4 milliGRAM(s) IV Push every 6 hours PRN Nausea and/or Vomiting  oxyCODONE    IR 5 milliGRAM(s) Oral every 6 hours PRN Severe Pain (7 - 10)      RADIOLOGY & ADDITIONAL TESTS:  
SLAVA ADAMSON    [id number]    77y      Female    INTERVAL HPI/OVERNIGHT EVENTS: patient being seen for c dif colitis. patient is ready for jerry    REVIEW OF SYSTEMS:    CONSTITUTIONAL: No fever, weight loss, or fatigue  RESPIRATORY: No cough, wheezing, hemoptysis; No shortness of breath  CARDIOVASCULAR: No chest pain, palpitations  GASTROINTESTINAL: No abdominal or epigastric pain. No nausea, vomiting  NEUROLOGICAL: No headaches, memory loss, loss of strength.  MISCELLANEOUS:      Vital Signs Last 24 Hrs  T(C): 36.7 (20 Sep 2022 04:30), Max: 37.1 (19 Sep 2022 17:29)  T(F): 98 (20 Sep 2022 04:30), Max: 98.7 (19 Sep 2022 17:29)  HR: 73 (20 Sep 2022 04:30) (73 - 89)  BP: 107/63 (20 Sep 2022 04:30) (107/63 - 130/75)  BP(mean): --  RR: 18 (20 Sep 2022 04:30) (18 - 18)  SpO2: 94% (20 Sep 2022 04:30) (93% - 96%)    Parameters below as of 20 Sep 2022 04:30  Patient On (Oxygen Delivery Method): room air        PHYSICAL EXAM:    GENERAL: NAD,   HEAD:  Atraumatic, Normocephalic  EYES: EOMI, PERRL, conjunctiva and sclera clear  NERVOUS SYSTEM:  Alert & Oriented X3,  Moves upper and lower extremities;   CHEST/LUNG: Clear to auscultation bilaterally; No rales, rhonchi, wheezing,   HEART: Regular rate and rhythm; No murmurs,   ABDOMEN: Soft, Nontender, Nondistended; Bowel sounds present  EXTREMITIES:  Peripheral Pulses, No  cyanosis, or edema  SKIN: No rashes or lesions  psychiatry- mood and affect appropriate        LABS:                        9.5    4.72  )-----------( 656      ( 19 Sep 2022 07:32 )             29.1     09-19    136  |  100  |  4.8<L>  ----------------------------<  81  3.3<L>   |  27.0  |  0.64    Ca    7.8<L>      19 Sep 2022 07:32  Mg     1.7     09-19    TPro  4.8<L>  /  Alb  2.4<L>  /  TBili  0.3<L>  /  DBili  x   /  AST  15  /  ALT  9   /  AlkPhos  66  09-19            MEDICATIONS  (STANDING):  folic acid 1 milliGRAM(s) Oral daily  lactobacillus acidophilus 1 Tablet(s) Oral daily  lisinopril 10 milliGRAM(s) Oral daily  vancomycin    Solution 125 milliGRAM(s) Oral every 6 hours    MEDICATIONS  (PRN):  acetaminophen     Tablet .. 650 milliGRAM(s) Oral every 6 hours PRN Mild Pain (1 - 3)  haloperidol    Injectable 5 milliGRAM(s) IntraMuscular every 6 hours PRN Agitation  ondansetron Injectable 4 milliGRAM(s) IV Push every 6 hours PRN Nausea and/or Vomiting  oxyCODONE    IR 5 milliGRAM(s) Oral every 6 hours PRN Severe Pain (7 - 10)      RADIOLOGY & ADDITIONAL TESTS:  
SLAVA ADAMSON Patient is a 77y old  Female who presents with a chief complaint of Colitis (13 Sep 2022 14:01)     HPI:  78 yo female w PMHx RA, HTN, fibromyalgia presents complaining of diffuse abdominal pain. Per pt and her daughter, pt admitted to [facility] for constipation and abdominal pain, discharged 2 weeks ago with dx of UTI and pyelonephritis. Since discharge, pt has had continued diffuse abdominal pain that waxes and wanes through the day, relieved especially after sleeping through the night. Pt also endorses decreased appetite, nausea, and watery, malodorous diarrhea 3-4 times daily. Patient's daughter notes increasing confusion compared to normal baseline.  Pt denies fevers, chills, headache, vomiting, chest pain, palpitations, shortness of breath, and dysuria.  (12 Sep 2022 13:48)    The patient was seen and evaluated restless in bed with 1:1 aid and daughter at bedside- states has pain and starts to get up and out of bed and is reoriented and the whole episode repeats again and again   The patient is in no acute distress.  Denied any fever chest pain, palpitations, shortness of breath, abdominal pain, fever, dysuria, cough, edema       I&O's Summary    Allergies    adhesives (Unknown)  latex (Blisters)  statins (Unknown)    Intolerances      HEALTH ISSUES - PROBLEM Dx:        PAST MEDICAL & SURGICAL HISTORY:  Hypertension      Rheumatoid arthritis      Depressed      Fibromyalgia      H/O lithotripsy              Vital Signs Last 24 Hrs  T(C): 37.7 (13 Sep 2022 11:50), Max: 37.7 (13 Sep 2022 11:50)  T(F): 99.8 (13 Sep 2022 11:50), Max: 99.8 (13 Sep 2022 11:50)  HR: 91 (13 Sep 2022 11:50) (66 - 104)  BP: 150/78 (13 Sep 2022 11:50) (150/78 - 169/88)  BP(mean): --  RR: 18 (13 Sep 2022 11:50) (18 - 18)  SpO2: 95% (13 Sep 2022 11:50) (95% - 99%)    Parameters below as of 13 Sep 2022 11:50  Patient On (Oxygen Delivery Method): room air    T(C): 37.7 (22 @ 11:50), Max: 37.7 (22 @ 11:50)  HR: 91 (22 @ 11:50) (66 - 104)  BP: 150/78 (22 @ 11:50) (150/78 - 169/88)  RR: 18 (22 @ 11:50) (18 - 18)  SpO2: 95% (22 @ 11:50) (95% - 99%)  Wt(kg): --    PHYSICAL EXAM:    GENERAL: NAD, well-groomed, well-developed  HEAD:  Atraumatic, Normocephalic  EYES: EOMI, PERRLA, conjunctiva and sclera clear  ENMT:  Moist mucous membranes,  No lesions  NECK: Supple, No JVD, Normal thyroid  NERVOUS SYSTEM:  Alert & Oriented X3,  Moves upper and lower extremities; CNS-II-XII  CHEST/LUNG: Clear to auscultation bilaterally; No rales, rhonchi, wheezing,   HEART: Regular rate and rhythm; No murmurs,   ABDOMEN: Soft, Nontender, Nondistended; Bowel sounds present  EXTREMITIES:  Peripheral Pulses, No  cyanosis, or edema  SKIN: No rashes or lesions  psychiatry- mood and affect approprite, Insight and judgement intact     acetaminophen     Tablet .. 650 milliGRAM(s) Oral every 6 hours PRN  ALPRAZolam 0.5 milliGRAM(s) Oral two times a day PRN  ciprofloxacin   IVPB 400 milliGRAM(s) IV Intermittent every 12 hours  dextrose 5% + sodium chloride 0.9%. 1000 milliLiter(s) IV Continuous <Continuous>  folic acid 1 milliGRAM(s) Oral daily  haloperidol    Injectable 5 milliGRAM(s) IntraMuscular once PRN  lisinopril 10 milliGRAM(s) Oral daily  metroNIDAZOLE  IVPB 500 milliGRAM(s) IV Intermittent every 8 hours  ondansetron Injectable 4 milliGRAM(s) IV Push every 6 hours PRN      LABS:                          11.6   5.00  )-----------( 400      ( 13 Sep 2022 04:36 )             34.6         139  |  101  |  24.6<H>  ----------------------------<  76  4.4   |  26.0  |  0.89    Ca    8.8      13 Sep 2022 04:36  Mg     1.8         TPro  5.6<L>  /  Alb  2.6<L>  /  TBili  0.8  /  DBili  x   /  AST  53<H>  /  ALT  28  /  AlkPhos  99      LIVER FUNCTIONS - ( 12 Sep 2022 08:52 )  Alb: 2.6 g/dL / Pro: 5.6 g/dL / ALK PHOS: 99 U/L / ALT: 28 U/L / AST: 53 U/L / GGT: x             CARDIAC MARKERS ( 12 Sep 2022 08:52 )  x     / x     / 24 U/L / x     / x          Urinalysis Basic - ( 12 Sep 2022 09:30 )    Color: Yellow / Appearance: Clear / S.015 / pH: x  Gluc: x / Ketone: Small  / Bili: Negative / Urobili: Negative mg/dL   Blood: x / Protein: 15 / Nitrite: Negative   Leuk Esterase: Moderate / RBC: 3-5 /HPF / WBC 3-5 /HPF   Sq Epi: x / Non Sq Epi: Few / Bacteria: Few      CAPILLARY BLOOD GLUCOSE          RADIOLOGY & ADDITIONAL TESTS:      Consultant notes reviewed    Case discussed with consultant/provider/ family /patient 
SLAVA ADAMSON Patient is a 77y old  Female who presents with a chief complaint of Vascular disorder of intestine     (15 Sep 2022 12:46)     HPI:  78 yo female w PMHx RA, HTN, fibromyalgia presents complaining of diffuse abdominal pain. Per pt and her daughter, pt admitted to [facility] for constipation and abdominal pain, discharged 2 weeks ago with dx of UTI and pyelonephritis. Since discharge, pt has had continued diffuse abdominal pain that waxes and wanes through the day, relieved especially after sleeping through the night. Pt also endorses decreased appetite, nausea, and watery, malodorous diarrhea 3-4 times daily. Patient's daughter notes increasing confusion compared to normal baseline.  Pt denies fevers, chills, headache, vomiting, chest pain, palpitations, shortness of breath, and dysuria.  (12 Sep 2022 13:48)    The patient was seen and evaluated   The patient is in no acute distress.  Denied any fever chest pain, palpitations, shortness of breath, abdominal pain, fever, dysuria, cough, edema       Height (cm): 172.7 (09-14 @ 23:27)  Weight (kg): 65.6 (09-14 @ 23:27)  BMI (kg/m2): 22 (09-14 @ 23:27)  BSA (m2): 1.78 (09-14 @ 23:27)I&O's Summary    14 Sep 2022 07:01  -  15 Sep 2022 07:00  --------------------------------------------------------  IN: 1600 mL / OUT: 500 mL / NET: 1100 mL    15 Sep 2022 07:01  -  15 Sep 2022 16:23  --------------------------------------------------------  IN: 900 mL / OUT: 0 mL / NET: 900 mL      Allergies    adhesives (Unknown)  latex (Blisters)  statins (Unknown)    Intolerances      HEALTH ISSUES - PROBLEM Dx:  Diarrhea          PAST MEDICAL & SURGICAL HISTORY:  Hypertension      Rheumatoid arthritis      Depressed      Fibromyalgia      H/O lithotripsy              Vital Signs Last 24 Hrs  T(C): 36.4 (15 Sep 2022 10:37), Max: 36.9 (14 Sep 2022 20:00)  T(F): 97.6 (15 Sep 2022 10:37), Max: 98.5 (14 Sep 2022 20:00)  HR: 93 (15 Sep 2022 10:37) (75 - 93)  BP: 100/63 (15 Sep 2022 10:37) (100/63 - 125/79)  BP(mean): --  RR: 18 (15 Sep 2022 10:37) (18 - 20)  SpO2: 93% (15 Sep 2022 10:37) (93% - 97%)    Parameters below as of 15 Sep 2022 10:37  Patient On (Oxygen Delivery Method): room air    T(C): 36.4 (09-15-22 @ 10:37), Max: 36.9 (09-14-22 @ 20:00)  HR: 93 (09-15-22 @ 10:37) (75 - 93)  BP: 100/63 (09-15-22 @ 10:37) (100/63 - 125/79)  RR: 18 (09-15-22 @ 10:37) (18 - 20)  SpO2: 93% (09-15-22 @ 10:37) (93% - 97%)  Wt(kg): --    PHYSICAL EXAM:    GENERAL: NAD, eldelry  HEAD:  Atraumatic, Normocephalic  EYES: EOMI, PERRL, conjunctiva and sclera clear  ENMT:  Moist mucous membranes,  No lesions  NECK: Supple, No JVD, Normal thyroid  NERVOUS SYSTEM:  Alert & Oriented X2,  Moves upper and lower extremities; CNS-II-XII  CHEST/LUNG: Clear to auscultation bilaterally; No rales, rhonchi, wheezing,   HEART: Regular rate and rhythm; No murmurs,   ABDOMEN: Soft, Nontender, Nondistended; Bowel sounds present  EXTREMITIES:  Peripheral Pulses, No  cyanosis, or edema  SKIN: multiple bruises  psychiatry- mood and affect appropriate Insight and judgement intact     acetaminophen     Tablet .. 650 milliGRAM(s) Oral every 6 hours PRN  ALPRAZolam 0.5 milliGRAM(s) Oral two times a day PRN  dextrose 5% + sodium chloride 0.9%. 1000 milliLiter(s) IV Continuous <Continuous>  folic acid 1 milliGRAM(s) Oral daily  haloperidol    Injectable 5 milliGRAM(s) IntraMuscular every 6 hours PRN  lactobacillus acidophilus 1 Tablet(s) Oral daily  lisinopril 10 milliGRAM(s) Oral daily  ondansetron Injectable 4 milliGRAM(s) IV Push every 6 hours PRN  oxyCODONE    IR 5 milliGRAM(s) Oral every 6 hours PRN  valACYclovir 1000 milliGRAM(s) Oral every 12 hours  vancomycin    Solution 125 milliGRAM(s) Oral every 6 hours      LABS:                          10.7   2.33  )-----------( 250      ( 14 Sep 2022 09:45 )             32.4     09-14    135  |  102  |  11.3  ----------------------------<  94  3.9   |  23.0  |  0.66    Ca    8.0<L>      14 Sep 2022 09:45    TPro  5.2<L>  /  Alb  2.5<L>  /  TBili  0.9  /  DBili  x   /  AST  24  /  ALT  17  /  AlkPhos  73  09-14    LIVER FUNCTIONS - ( 14 Sep 2022 09:45 )  Alb: 2.5 g/dL / Pro: 5.2 g/dL / ALK PHOS: 73 U/L / ALT: 17 U/L / AST: 24 U/L / GGT: x                   CAPILLARY BLOOD GLUCOSE          RADIOLOGY & ADDITIONAL TESTS:      Consultant notes reviewed    Case discussed with consultant/provider/ family /patient 
SLAVA ADAMSON Patient is a 77y old  Female who presents with a chief complaint of Colitis (15 Sep 2022 16:22)     HPI:  76 yo female w PMHx RA, HTN, fibromyalgia presents complaining of diffuse abdominal pain. Per pt and her daughter, pt admitted to [facility] for constipation and abdominal pain, discharged 2 weeks ago with dx of UTI and pyelonephritis. Since discharge, pt has had continued diffuse abdominal pain that waxes and wanes through the day, relieved especially after sleeping through the night. Pt also endorses decreased appetite, nausea, and watery, malodorous diarrhea 3-4 times daily. Patient's daughter notes increasing confusion compared to normal baseline.  Pt denies fevers, chills, headache, vomiting, chest pain, palpitations, shortness of breath, and dysuria.  (12 Sep 2022 13:48)    The patient was seen and evaluated   The patient is in no acute distress.  Denied any fever chest pain, palpitations, shortness of breath, fever, dysuria, cough, edema   Complains of some abdominal discomfort- right now is OK    I&O's Summary    15 Sep 2022 07:01  -  16 Sep 2022 07:00  --------------------------------------------------------  IN: 2330 mL / OUT: 0 mL / NET: 2330 mL      Allergies    adhesives (Unknown)  latex (Blisters)  statins (Unknown)    Intolerances      HEALTH ISSUES - PROBLEM Dx:  Diarrhea          PAST MEDICAL & SURGICAL HISTORY:  Hypertension      Rheumatoid arthritis      Depressed      Fibromyalgia      H/O lithotripsy              Vital Signs Last 24 Hrs  T(C): 36.5 (16 Sep 2022 16:22), Max: 36.8 (16 Sep 2022 04:46)  T(F): 97.7 (16 Sep 2022 16:22), Max: 98.3 (16 Sep 2022 04:46)  HR: 82 (16 Sep 2022 16:22) (80 - 85)  BP: 120/69 (16 Sep 2022 16:22) (100/63 - 120/69)  BP(mean): --  RR: 18 (16 Sep 2022 16:22) (16 - 18)  SpO2: 95% (16 Sep 2022 16:22) (93% - 96%)    Parameters below as of 16 Sep 2022 16:22  Patient On (Oxygen Delivery Method): room air    T(C): 36.5 (09-16-22 @ 16:22), Max: 36.8 (09-16-22 @ 04:46)  HR: 82 (09-16-22 @ 16:22) (80 - 85)  BP: 120/69 (09-16-22 @ 16:22) (100/63 - 120/69)  RR: 18 (09-16-22 @ 16:22) (16 - 18)  SpO2: 95% (09-16-22 @ 16:22) (93% - 96%)  Wt(kg): --    PHYSICAL EXAM:    GENERAL: NAD, elderly confused AT TIMES  pleasantly   HEAD:  Atraumatic, Normocephalic  EYES: EOMI, PERRL, conjunctiva and sclera clear  ENMT:  Moist mucous membranes,  No lesions  NECK: Supple, No JVD, Normal thyroid  NERVOUS SYSTEM:  Alert & Oriented X3,  Moves upper and lower extremities; CNS-II-XII  CHEST/LUNG: Clear to auscultation bilaterally; No rales, rhonchi, wheezing,   HEART: Regular rate and rhythm; No murmurs,   ABDOMEN: Soft, Nontender, Nondistended; Bowel sounds present  EXTREMITIES:  Peripheral Pulses, No  cyanosis, or edema  SKIN: No rashes or lesions  psychiatry- mood and affect appropriate    acetaminophen     Tablet .. 650 milliGRAM(s) Oral every 6 hours PRN  ALPRAZolam 0.5 milliGRAM(s) Oral two times a day PRN  dextrose 5% + sodium chloride 0.9%. 1000 milliLiter(s) IV Continuous <Continuous>  folic acid 1 milliGRAM(s) Oral daily  haloperidol    Injectable 5 milliGRAM(s) IntraMuscular every 6 hours PRN  lactobacillus acidophilus 1 Tablet(s) Oral daily  lisinopril 10 milliGRAM(s) Oral daily  ondansetron Injectable 4 milliGRAM(s) IV Push every 6 hours PRN  oxyCODONE    IR 5 milliGRAM(s) Oral every 6 hours PRN  valACYclovir 1000 milliGRAM(s) Oral every 12 hours  vancomycin    Solution 125 milliGRAM(s) Oral every 6 hours      LABS:                      CAPILLARY BLOOD GLUCOSE          RADIOLOGY & ADDITIONAL TESTS:      Consultant notes reviewed    Case discussed with consultant/provider/ family /patient

## 2022-09-19 NOTE — PROGRESS NOTE ADULT - NUTRITIONAL ASSESSMENT
This patient has been assessed with a concern for Malnutrition and has been determined to have a diagnosis/diagnoses of Moderate protein-calorie malnutrition.    This patient is being managed with:   Diet Low Fiber-  Entered: Sep 16 2022  7:13PM    
This patient has been assessed with a concern for Malnutrition and has been determined to have a diagnosis/diagnoses of Moderate protein-calorie malnutrition.    This patient is being managed with:   Diet Low Fiber-  Entered: Sep 16 2022  7:13PM    
This patient has been assessed with a concern for Malnutrition and has been determined to have a diagnosis/diagnoses of Moderate protein-calorie malnutrition.    This patient is being managed with:   Diet Clear Liquid-  Entered: Sep 13 2022  2:05PM    
This patient has been assessed with a concern for Malnutrition and has been determined to have a diagnosis/diagnoses of Moderate protein-calorie malnutrition.    This patient is being managed with:   Diet Low Fiber-  Entered: Sep 16 2022  7:13PM    
This patient has been assessed with a concern for Malnutrition and has been determined to have a diagnosis/diagnoses of Moderate protein-calorie malnutrition.    This patient is being managed with:   Diet Clear Liquid-  Entered: Sep 13 2022  2:05PM

## 2022-09-19 NOTE — PROGRESS NOTE ADULT - PROVIDER SPECIALTY LIST ADULT
Gastroenterology
Internal Medicine
Hospitalist
Gastroenterology
Gastroenterology
Internal Medicine
Internal Medicine
Hospitalist

## 2022-09-20 ENCOUNTER — TRANSCRIPTION ENCOUNTER (OUTPATIENT)
Age: 77
End: 2022-09-20

## 2022-09-20 VITALS
HEART RATE: 75 BPM | OXYGEN SATURATION: 95 % | DIASTOLIC BLOOD PRESSURE: 63 MMHG | SYSTOLIC BLOOD PRESSURE: 122 MMHG | RESPIRATION RATE: 20 BRPM | TEMPERATURE: 99 F

## 2022-09-20 PROCEDURE — 84295 ASSAY OF SERUM SODIUM: CPT

## 2022-09-20 PROCEDURE — 82947 ASSAY GLUCOSE BLOOD QUANT: CPT

## 2022-09-20 PROCEDURE — 71045 X-RAY EXAM CHEST 1 VIEW: CPT

## 2022-09-20 PROCEDURE — 81001 URINALYSIS AUTO W/SCOPE: CPT

## 2022-09-20 PROCEDURE — 85027 COMPLETE CBC AUTOMATED: CPT

## 2022-09-20 PROCEDURE — U0005: CPT

## 2022-09-20 PROCEDURE — 87637 SARSCOV2&INF A&B&RSV AMP PRB: CPT

## 2022-09-20 PROCEDURE — 83735 ASSAY OF MAGNESIUM: CPT

## 2022-09-20 PROCEDURE — 87046 STOOL CULTR AEROBIC BACT EA: CPT

## 2022-09-20 PROCEDURE — 87186 SC STD MICRODIL/AGAR DIL: CPT

## 2022-09-20 PROCEDURE — 87077 CULTURE AEROBIC IDENTIFY: CPT

## 2022-09-20 PROCEDURE — 72125 CT NECK SPINE W/O DYE: CPT | Mod: MA

## 2022-09-20 PROCEDURE — 85025 COMPLETE CBC W/AUTO DIFF WBC: CPT

## 2022-09-20 PROCEDURE — 70450 CT HEAD/BRAIN W/O DYE: CPT | Mod: MA

## 2022-09-20 PROCEDURE — 99239 HOSP IP/OBS DSCHRG MGMT >30: CPT

## 2022-09-20 PROCEDURE — 82550 ASSAY OF CK (CPK): CPT

## 2022-09-20 PROCEDURE — 87045 FECES CULTURE AEROBIC BACT: CPT

## 2022-09-20 PROCEDURE — 96375 TX/PRO/DX INJ NEW DRUG ADDON: CPT

## 2022-09-20 PROCEDURE — 85014 HEMATOCRIT: CPT

## 2022-09-20 PROCEDURE — 82803 BLOOD GASES ANY COMBINATION: CPT

## 2022-09-20 PROCEDURE — 84443 ASSAY THYROID STIM HORMONE: CPT

## 2022-09-20 PROCEDURE — 82330 ASSAY OF CALCIUM: CPT

## 2022-09-20 PROCEDURE — 74177 CT ABD & PELVIS W/CONTRAST: CPT | Mod: MA

## 2022-09-20 PROCEDURE — 83690 ASSAY OF LIPASE: CPT

## 2022-09-20 PROCEDURE — 93005 ELECTROCARDIOGRAM TRACING: CPT

## 2022-09-20 PROCEDURE — 83605 ASSAY OF LACTIC ACID: CPT

## 2022-09-20 PROCEDURE — 36415 COLL VENOUS BLD VENIPUNCTURE: CPT

## 2022-09-20 PROCEDURE — 82272 OCCULT BLD FECES 1-3 TESTS: CPT

## 2022-09-20 PROCEDURE — 80048 BASIC METABOLIC PNL TOTAL CA: CPT

## 2022-09-20 PROCEDURE — 84132 ASSAY OF SERUM POTASSIUM: CPT

## 2022-09-20 PROCEDURE — 82435 ASSAY OF BLOOD CHLORIDE: CPT

## 2022-09-20 PROCEDURE — 87493 C DIFF AMPLIFIED PROBE: CPT

## 2022-09-20 PROCEDURE — 87086 URINE CULTURE/COLONY COUNT: CPT

## 2022-09-20 PROCEDURE — U0003: CPT

## 2022-09-20 PROCEDURE — 85018 HEMOGLOBIN: CPT

## 2022-09-20 PROCEDURE — 96374 THER/PROPH/DIAG INJ IV PUSH: CPT

## 2022-09-20 PROCEDURE — 99285 EMERGENCY DEPT VISIT HI MDM: CPT

## 2022-09-20 PROCEDURE — 80053 COMPREHEN METABOLIC PANEL: CPT

## 2022-09-20 RX ORDER — METHOTREXATE 2.5 MG/1
6 TABLET ORAL
Qty: 0 | Refills: 0 | DISCHARGE

## 2022-09-20 RX ADMIN — OXYCODONE HYDROCHLORIDE 5 MILLIGRAM(S): 5 TABLET ORAL at 07:27

## 2022-09-20 RX ADMIN — LISINOPRIL 10 MILLIGRAM(S): 2.5 TABLET ORAL at 06:59

## 2022-09-20 RX ADMIN — Medication 125 MILLIGRAM(S): at 02:07

## 2022-09-20 RX ADMIN — Medication 650 MILLIGRAM(S): at 01:05

## 2022-09-20 RX ADMIN — Medication 650 MILLIGRAM(S): at 00:10

## 2022-09-20 RX ADMIN — Medication 1 TABLET(S): at 10:33

## 2022-09-20 RX ADMIN — Medication 1 MILLIGRAM(S): at 10:33

## 2022-09-20 RX ADMIN — Medication 125 MILLIGRAM(S): at 08:52

## 2022-09-20 RX ADMIN — Medication 500 MILLIGRAM(S): at 10:33

## 2022-09-20 RX ADMIN — OXYCODONE HYDROCHLORIDE 5 MILLIGRAM(S): 5 TABLET ORAL at 06:59

## 2022-09-20 NOTE — DISCHARGE NOTE NURSING/CASE MANAGEMENT/SOCIAL WORK - PATIENT PORTAL LINK FT
You can access the FollowMyHealth Patient Portal offered by [facility] by registering at the following website: [email or web address]. By joining [facility]’s FollowMyHealth portal, you will also be able to view your health information using other applications (apps) compatible with our system.

## 2022-09-20 NOTE — DISCHARGE NOTE NURSING/CASE MANAGEMENT/SOCIAL WORK - NSDCPEFALRISK_GEN_ALL_CORE
For information on Fall & Injury Prevention, visit: [email or web address] OR  [email or web address] OR  https://www.cdc.gov/steadi/patient.html

## 2022-10-02 ENCOUNTER — TRANSCRIPTION ENCOUNTER (OUTPATIENT)
Age: 77
End: 2022-10-02

## 2022-10-02 ENCOUNTER — INPATIENT (INPATIENT)
Facility: HOSPITAL | Age: 77
LOS: 7 days | Discharge: NURSING, REHAB OR LONG-TERM CARE | DRG: 871 | End: 2022-10-10
Attending: STUDENT IN AN ORGANIZED HEALTH CARE EDUCATION/TRAINING PROGRAM | Admitting: STUDENT IN AN ORGANIZED HEALTH CARE EDUCATION/TRAINING PROGRAM
Payer: MEDICARE

## 2022-10-02 VITALS
HEART RATE: 120 BPM | RESPIRATION RATE: 20 BRPM | TEMPERATURE: 101 F | DIASTOLIC BLOOD PRESSURE: 55 MMHG | HEIGHT: 68 IN | SYSTOLIC BLOOD PRESSURE: 128 MMHG | OXYGEN SATURATION: 95 %

## 2022-10-02 DIAGNOSIS — Z98.89 OTHER SPECIFIED POSTPROCEDURAL STATES: Chronic | ICD-10-CM

## 2022-10-02 DIAGNOSIS — A41.9 SEPSIS, UNSPECIFIED ORGANISM: ICD-10-CM

## 2022-10-02 LAB
ALBUMIN SERPL ELPH-MCNC: 3.1 G/DL — LOW (ref 3.3–5.2)
ALP SERPL-CCNC: 92 U/L — SIGNIFICANT CHANGE UP (ref 40–120)
ALT FLD-CCNC: 11 U/L — SIGNIFICANT CHANGE UP
ANION GAP SERPL CALC-SCNC: 13 MMOL/L — SIGNIFICANT CHANGE UP (ref 5–17)
ANISOCYTOSIS BLD QL: SLIGHT — SIGNIFICANT CHANGE UP
APPEARANCE UR: ABNORMAL
APTT BLD: 29.1 SEC — SIGNIFICANT CHANGE UP (ref 27.5–35.5)
AST SERPL-CCNC: 26 U/L — SIGNIFICANT CHANGE UP
BACTERIA # UR AUTO: ABNORMAL
BASE EXCESS BLDV CALC-SCNC: 8 MMOL/L — HIGH (ref -2–3)
BASOPHILS # BLD AUTO: 0.12 K/UL — SIGNIFICANT CHANGE UP (ref 0–0.2)
BASOPHILS NFR BLD AUTO: 0.9 % — SIGNIFICANT CHANGE UP (ref 0–2)
BILIRUB SERPL-MCNC: 0.5 MG/DL — SIGNIFICANT CHANGE UP (ref 0.4–2)
BILIRUB UR-MCNC: NEGATIVE — SIGNIFICANT CHANGE UP
BUN SERPL-MCNC: 13.9 MG/DL — SIGNIFICANT CHANGE UP (ref 8–20)
BURR CELLS BLD QL SMEAR: PRESENT — SIGNIFICANT CHANGE UP
CA-I SERPL-SCNC: 1.17 MMOL/L — SIGNIFICANT CHANGE UP (ref 1.15–1.33)
CALCIUM SERPL-MCNC: 9.4 MG/DL — SIGNIFICANT CHANGE UP (ref 8.4–10.5)
CHLORIDE BLDV-SCNC: 98 MMOL/L — SIGNIFICANT CHANGE UP (ref 98–107)
CHLORIDE SERPL-SCNC: 97 MMOL/L — LOW (ref 98–107)
CO2 SERPL-SCNC: 26 MMOL/L — SIGNIFICANT CHANGE UP (ref 22–29)
COLOR SPEC: YELLOW — SIGNIFICANT CHANGE UP
CREAT SERPL-MCNC: 0.81 MG/DL — SIGNIFICANT CHANGE UP (ref 0.5–1.3)
DACRYOCYTES BLD QL SMEAR: SLIGHT — SIGNIFICANT CHANGE UP
DIFF PNL FLD: ABNORMAL
EGFR: 74 ML/MIN/1.73M2 — SIGNIFICANT CHANGE UP
EOSINOPHIL # BLD AUTO: 0.12 K/UL — SIGNIFICANT CHANGE UP (ref 0–0.5)
EOSINOPHIL NFR BLD AUTO: 0.9 % — SIGNIFICANT CHANGE UP (ref 0–6)
EPI CELLS # UR: ABNORMAL
GAS PNL BLDV: 131 MMOL/L — LOW (ref 136–145)
GAS PNL BLDV: SIGNIFICANT CHANGE UP
GIANT PLATELETS BLD QL SMEAR: PRESENT — SIGNIFICANT CHANGE UP
GLUCOSE BLDV-MCNC: 121 MG/DL — HIGH (ref 70–99)
GLUCOSE SERPL-MCNC: 110 MG/DL — HIGH (ref 70–99)
GLUCOSE UR QL: NEGATIVE — SIGNIFICANT CHANGE UP
HCO3 BLDV-SCNC: 33 MMOL/L — HIGH (ref 22–29)
HCT VFR BLD CALC: 39.6 % — SIGNIFICANT CHANGE UP (ref 34.5–45)
HCT VFR BLDA CALC: 40 % — SIGNIFICANT CHANGE UP
HGB BLD CALC-MCNC: 13.4 G/DL — SIGNIFICANT CHANGE UP (ref 11.7–16.1)
HGB BLD-MCNC: 13 G/DL — SIGNIFICANT CHANGE UP (ref 11.5–15.5)
INR BLD: 1.14 RATIO — SIGNIFICANT CHANGE UP (ref 0.88–1.16)
KETONES UR-MCNC: NEGATIVE — SIGNIFICANT CHANGE UP
LACTATE BLDV-MCNC: 1.5 MMOL/L — SIGNIFICANT CHANGE UP (ref 0.5–2)
LEUKOCYTE ESTERASE UR-ACNC: ABNORMAL
LYMPHOCYTES # BLD AUTO: 16.8 % — SIGNIFICANT CHANGE UP (ref 13–44)
LYMPHOCYTES # BLD AUTO: 2.22 K/UL — SIGNIFICANT CHANGE UP (ref 1–3.3)
MACROCYTES BLD QL: SLIGHT — SIGNIFICANT CHANGE UP
MANUAL SMEAR VERIFICATION: SIGNIFICANT CHANGE UP
MCHC RBC-ENTMCNC: 30.6 PG — SIGNIFICANT CHANGE UP (ref 27–34)
MCHC RBC-ENTMCNC: 32.8 GM/DL — SIGNIFICANT CHANGE UP (ref 32–36)
MCV RBC AUTO: 93.2 FL — SIGNIFICANT CHANGE UP (ref 80–100)
MONOCYTES # BLD AUTO: 1.64 K/UL — HIGH (ref 0–0.9)
MONOCYTES NFR BLD AUTO: 12.4 % — SIGNIFICANT CHANGE UP (ref 2–14)
NEUTROPHILS # BLD AUTO: 9 K/UL — HIGH (ref 1.8–7.4)
NEUTROPHILS NFR BLD AUTO: 68.1 % — SIGNIFICANT CHANGE UP (ref 43–77)
NITRITE UR-MCNC: POSITIVE
OVALOCYTES BLD QL SMEAR: SLIGHT — SIGNIFICANT CHANGE UP
PCO2 BLDV: 53 MMHG — HIGH (ref 39–42)
PH BLDV: 7.4 — SIGNIFICANT CHANGE UP (ref 7.32–7.43)
PH UR: 7 — SIGNIFICANT CHANGE UP (ref 5–8)
PLAT MORPH BLD: NORMAL — SIGNIFICANT CHANGE UP
PLATELET # BLD AUTO: 809 K/UL — HIGH (ref 150–400)
PO2 BLDV: 43 MMHG — SIGNIFICANT CHANGE UP (ref 25–45)
POIKILOCYTOSIS BLD QL AUTO: SLIGHT — SIGNIFICANT CHANGE UP
POLYCHROMASIA BLD QL SMEAR: SLIGHT — SIGNIFICANT CHANGE UP
POTASSIUM BLDV-SCNC: 4.9 MMOL/L — SIGNIFICANT CHANGE UP (ref 3.5–5.1)
POTASSIUM SERPL-MCNC: 4.7 MMOL/L — SIGNIFICANT CHANGE UP (ref 3.5–5.3)
POTASSIUM SERPL-SCNC: 4.7 MMOL/L — SIGNIFICANT CHANGE UP (ref 3.5–5.3)
PROT SERPL-MCNC: 7 G/DL — SIGNIFICANT CHANGE UP (ref 6.6–8.7)
PROT UR-MCNC: NEGATIVE — SIGNIFICANT CHANGE UP
PROTHROM AB SERPL-ACNC: 13.2 SEC — SIGNIFICANT CHANGE UP (ref 10.5–13.4)
RAPID RVP RESULT: SIGNIFICANT CHANGE UP
RBC # BLD: 4.25 M/UL — SIGNIFICANT CHANGE UP (ref 3.8–5.2)
RBC # FLD: 15.6 % — HIGH (ref 10.3–14.5)
RBC BLD AUTO: ABNORMAL
RBC CASTS # UR COMP ASSIST: SIGNIFICANT CHANGE UP /HPF (ref 0–4)
SAO2 % BLDV: 69.2 % — SIGNIFICANT CHANGE UP
SARS-COV-2 RNA SPEC QL NAA+PROBE: SIGNIFICANT CHANGE UP
SODIUM SERPL-SCNC: 136 MMOL/L — SIGNIFICANT CHANGE UP (ref 135–145)
SP GR SPEC: 1 — LOW (ref 1.01–1.02)
UROBILINOGEN FLD QL: NEGATIVE — SIGNIFICANT CHANGE UP
VARIANT LYMPHS # BLD: 0.9 % — SIGNIFICANT CHANGE UP (ref 0–6)
WBC # BLD: 13.21 K/UL — HIGH (ref 3.8–10.5)
WBC # FLD AUTO: 13.21 K/UL — HIGH (ref 3.8–10.5)
WBC UR QL: ABNORMAL /HPF (ref 0–5)

## 2022-10-02 PROCEDURE — 71045 X-RAY EXAM CHEST 1 VIEW: CPT | Mod: 26

## 2022-10-02 PROCEDURE — 70450 CT HEAD/BRAIN W/O DYE: CPT | Mod: 26,MA

## 2022-10-02 PROCEDURE — 99223 1ST HOSP IP/OBS HIGH 75: CPT

## 2022-10-02 PROCEDURE — 99285 EMERGENCY DEPT VISIT HI MDM: CPT | Mod: CS

## 2022-10-02 PROCEDURE — 93010 ELECTROCARDIOGRAM REPORT: CPT

## 2022-10-02 RX ORDER — ACETAMINOPHEN 500 MG
650 TABLET ORAL EVERY 6 HOURS
Refills: 0 | Status: DISCONTINUED | OUTPATIENT
Start: 2022-10-02 | End: 2022-10-10

## 2022-10-02 RX ORDER — KETOROLAC TROMETHAMINE 30 MG/ML
15 SYRINGE (ML) INJECTION ONCE
Refills: 0 | Status: DISCONTINUED | OUTPATIENT
Start: 2022-10-02 | End: 2022-10-02

## 2022-10-02 RX ORDER — VANCOMYCIN HCL 1 G
1000 VIAL (EA) INTRAVENOUS ONCE
Refills: 0 | Status: COMPLETED | OUTPATIENT
Start: 2022-10-02 | End: 2022-10-02

## 2022-10-02 RX ORDER — LANOLIN ALCOHOL/MO/W.PET/CERES
3 CREAM (GRAM) TOPICAL AT BEDTIME
Refills: 0 | Status: DISCONTINUED | OUTPATIENT
Start: 2022-10-02 | End: 2022-10-10

## 2022-10-02 RX ORDER — ONDANSETRON 8 MG/1
4 TABLET, FILM COATED ORAL EVERY 8 HOURS
Refills: 0 | Status: DISCONTINUED | OUTPATIENT
Start: 2022-10-02 | End: 2022-10-10

## 2022-10-02 RX ORDER — SODIUM CHLORIDE 9 MG/ML
1920 INJECTION, SOLUTION INTRAVENOUS ONCE
Refills: 0 | Status: COMPLETED | OUTPATIENT
Start: 2022-10-02 | End: 2022-10-02

## 2022-10-02 RX ORDER — PIPERACILLIN AND TAZOBACTAM 4; .5 G/20ML; G/20ML
3.38 INJECTION, POWDER, LYOPHILIZED, FOR SOLUTION INTRAVENOUS ONCE
Refills: 0 | Status: COMPLETED | OUTPATIENT
Start: 2022-10-02 | End: 2022-10-02

## 2022-10-02 RX ORDER — ACETAMINOPHEN 500 MG
650 TABLET ORAL ONCE
Refills: 0 | Status: COMPLETED | OUTPATIENT
Start: 2022-10-02 | End: 2022-10-02

## 2022-10-02 RX ADMIN — Medication 250 MILLIGRAM(S): at 21:34

## 2022-10-02 RX ADMIN — PIPERACILLIN AND TAZOBACTAM 3.38 GRAM(S): 4; .5 INJECTION, POWDER, LYOPHILIZED, FOR SOLUTION INTRAVENOUS at 21:35

## 2022-10-02 RX ADMIN — SODIUM CHLORIDE 1920 MILLILITER(S): 9 INJECTION, SOLUTION INTRAVENOUS at 18:46

## 2022-10-02 RX ADMIN — PIPERACILLIN AND TAZOBACTAM 200 GRAM(S): 4; .5 INJECTION, POWDER, LYOPHILIZED, FOR SOLUTION INTRAVENOUS at 20:35

## 2022-10-02 RX ADMIN — Medication 650 MILLIGRAM(S): at 18:46

## 2022-10-02 NOTE — H&P ADULT - HISTORY OF PRESENT ILLNESS
78 y/o female with PMH of RA, HTN, fibromyalgia was sent to the ED from [facility] for altered mental status. Patient said she was told she was hallucinating; but she was just having a dream that she was in a wedding. As per EMS patient developed altered mental status, yelling she wants her dog to come to her wedding, climbing out of bed. Patient reported hx of recurrent UTI, noted burning with urinary. She has no nausea, vomiting, abdominal pain, chest pain, shortness of breath, palpitation, fall, HA. She was recently admitted and treated for C-diff.

## 2022-10-02 NOTE — H&P ADULT - ASSESSMENT
78 y/o female with PMH of RA, HTN, fibromyalgia was sent to the ED from [facility] for altered mental status. Patient said she was told she was hallucinating; but she was just having a dream that she was in a wedding. As per EMS patient developed altered mental status, yelling she wants her dog to come to her wedding, climbing out of bed. Patient reported hx of recurrent UTI, noted burning with urinary. She has no nausea, vomiting, abdominal pain, chest pain, shortness of breath, palpitation, fall, HA. She was recently admitted and treated for C-diff. In the ED, patient was febrile, tachycardic, WBC: 13.21 code sepsis initiated. CT head: no acute intracranial finding. UA concerning for UTI.     Acute encephalopathy due to UTI   Admit to medical floor   CT head no acute finding   UA noted; urine culture pending   Zosyn and Vancomycin once given in the ED  Prior urine culture grew Klebsiella aerogenes sensitive to Zosyn, will continue   Follow up urine culture     HTN  Ramipril 2.5mg (equivalent to Lisinopril 10mg)    RA/Fibromyalgia   MTX 20mg q Friday   Folic acid     Anxiety   Xanax 0.5mg bid PRN     Supportive   DVT prophylaxis: Heparin sc   Diet: DASH     Plan of care discussed with patient

## 2022-10-02 NOTE — ED ADULT NURSE NOTE - SUICIDE SCREENING QUESTION 3
Review of Systems Health Update    ALL PATIENTS:  []  YES    [x]  NO   Chest Pain  []  YES    [x]  NO   New shortness of breath  []  YES    [x]  NO   Unexplained weight change    DIABETES:  []  YES    [x]  NO   Excessive Thirst, urination or hunger  []  YES    [x]  NO   Repeated infections/Yeast infections  []  YES    [x]  NO   Numbness tingling in hands or feet  []  YES    [x]  NO   History or pancreatitis (inflammation of the pancreas)  []  YES    [x]  NO   History of gastroparesis (Slowed stomach emptying)  []  YES    [x]  NO   History of heart problem    WEIGHT MANAGEMENT:  []  YES    [x]  NO   Stress eating   []  YES    [x]  NO   New joint pain/redness  []  YES    [x]  NO   Dizziness/Lightheadedness  []  YES    [x]  NO   Leg Cramps  []  YES    [x]  NO   Worsening depression/Anxiety  []  YES    [x]  NO   Heartburn  []  YES    [x]  NO   Constipation/Diarrhea  []  YES    [x]  NO   Hair Loss  []  YES    [x]  NO   New Kidney stones  []  YES    [x]  NO   Rash    -----------------------------    Body Composition  InBody 570    Intracellular water = 69.7 lbs  Extracellular Water = 44.5 lbs  Dry Lean Mass = 40.6 lbs  Body Fat Mass = 101.4 lbs    Total Body Water = 114.2 lbs  Lean Body Mass = 154.8 lbs  Weight = 256.2 lbs    Skeletal Muscle Mass 86.4 lbs    Percent Body Fat =  39.6 %  Extracellular water/Total Body Water = 0.390  Visceral Fat Level = 20     No

## 2022-10-02 NOTE — ED PROVIDER NOTE - PROGRESS NOTE DETAILS
JK - patient vss, resting comfortably. Lactate, labs, CT scan WNL. Patient still mildly confused, UA showing UTI, patient mental status likely due to metabolic encephalopathy secondary to UTI. Ready and agreeable to admission to medicine for further IVF, abx, and monitoring.

## 2022-10-02 NOTE — ED ADULT NURSE NOTE - OBJECTIVE STATEMENT
pt presents to ed by ambulance from [facility].  per facility, patient with worsening mentation. patient states she was trying to get to a wedding.  hx dementia.  patient A&Ox3, confused at times.  NAD at this time. pt presents to ed by ambulance from [facility].  per facility, patient with worsening mentation. patient states she was trying to get to a wedding.  hx dementia.  patient A&Ox3, confused at times.  patient with recent cdiff, being treated with vanco.  no reporting of diarrhea at this time. NAD at this time.

## 2022-10-02 NOTE — H&P ADULT - NSHPPHYSICALEXAM_GEN_ALL_CORE
Vital Signs Last 24 Hrs  T(C): 38.1 (02 Oct 2022 18:20), Max: 38.1 (02 Oct 2022 18:20)  T(F): 100.6 (02 Oct 2022 18:20), Max: 100.6 (02 Oct 2022 18:20)  HR: 102 (02 Oct 2022 18:53) (102 - 120)  BP: 128/69 (02 Oct 2022 18:53) (128/55 - 128/69)  BP(mean): --  RR: 20 (02 Oct 2022 18:20) (20 - 20)  SpO2: 98% (02 Oct 2022 18:53) (95% - 98%)    Parameters below as of 02 Oct 2022 18:53  Patient On (Oxygen Delivery Method): room air

## 2022-10-02 NOTE — ED PROVIDER NOTE - OBJECTIVE STATEMENT
Patient is a 78 yo female with PMHx RA, HTN, fibromyalgia, recent admission for C. Diff colitis now s/p PO vancomycin presenting from [facility] with altered mental status. As per EMS and [facility] staff patient developed altered mental status, yelling she wants her dog to come to her wedding, climbing out of bed, at which point EMS was called. Patient arrives febrile, tachycardic, code sepsis initiated. BP WNL, patient aaox2, confused, stating she would like to find her dog so she could take him to her wedding. No FND, moving all extremities equally. Patient and staff stating she has still been having intermittent diarrhea which is improved from her prior admission. Denies fevers, chills, dizziness, lightheadedness, dysphagia, dysarthria, diplopia, photophobia, syncope, cough, congestion, SOB, CP, abdominal pain, neck pain, back pain,  dysuria, hematuria, hematochezia, hematemesis, n/v, recent travel, sick contacts, leg swelling.

## 2022-10-02 NOTE — ED ADULT TRIAGE NOTE - CHIEF COMPLAINT QUOTE
" I was trying to go to my wedding, my dog was supposed to be my best man" pt from [facility], hx of dementia as per staff pt has change in mental status, LKW [id number], MD Mcdonnell at bedside for evaluation.

## 2022-10-02 NOTE — ED PROVIDER NOTE - CLINICAL SUMMARY MEDICAL DECISION MAKING FREE TEXT BOX
Patient is a 78 yo female with PMHx RA, HTN, fibromyalgia, recent admission for C. Diff colitis now s/p PO vancomycin presenting from [facility] with altered mental status. As per EMS and [facility] staff patient developed altered mental status, yelling she wants her dog to come to her wedding, climbing out of bed, at which point EMS was called. Patient arrives febrile, tachycardic, code sepsis initiated. BP WNL, patient aaox2, confused. Will hydrate, give abx for sepsis likely secondary to UTI vs. colitis, obtain cultures, check lactate, r/o intracranial pathology with CTH, reassess.

## 2022-10-02 NOTE — ED PROVIDER NOTE - PHYSICAL EXAMINATION
Constitutional: Well appearing, awake, alert, oriented to person, place, not time or situation, altered from baseline, and in no apparent distress  ENMT: Airway patent nasal mucosa clear. Mouth with normal mucosa. Throat has no vesicles no oropharyngeal exudates and uvula is midline. No blood in the oropharynx  EYES: clear bilaterally, pupils equal, round and reactive to light  Cardiac: Tachycardic, regular rhythm. Heart sounds S1, S2. No murmurs, rubs or gallops. Good capillary refill, 2+ pulses, no peripheral edema  Respiratory: Lungs CTAB, no use of accessory muscles, no crackles, satting 95% on RA in no distress  Gastrointestinal: Abdomen nondistended, non-tender, no rebound guarding or peritoneal signs  Genitourinary: No CVA tenderness, pelvis nontender, bladder nondistended  Musculoskeletal: Spine appears normal, range of motion is not limited, no muscle or joint tenderness  Neurological: Alert, mildly confused, no focal deficits, no motor or sensory deficits. CN 2-12 intact, PERRLA, EOMI, No FND, moving all extremities equally, sensation intact

## 2022-10-02 NOTE — ED PROVIDER NOTE - ATTENDING CONTRIBUTION TO CARE
Eriberto: I performed a face to face bedside interview with patient regarding history of present illness, review of symptoms and past medical history. I completed an independent physical exam.  I have discussed patient's plan of care with resident.   I agree with note as stated above including HISTORY OF PRESENT ILLNESS, HIV, PAST MEDICAL/SURGICAL/FAMILY/SOCIAL HISTORY, ALLERGIES AND HOME MEDICATIONS, REVIEW OF SYSTEMS, PHYSICAL EXAM, MEDICAL DECISION MAKING and any PROGRESS NOTES during the time I functioned as the attending physician for this patient unless otherwise noted. My brief assessment is as follows: Elderly patient with sepsis and AMS, recent UTI and cdiff, diarrhea has been slowing.   Sepsis - undifferentiated  1) IV Access/IVF (30cc/kg bolus)/LABS/Lactate (rpt after IVF if elevated)/UA/Cultures  2) CXR, CTH  3) IV Abx  4) Admit

## 2022-10-02 NOTE — ED PROVIDER NOTE - CARE PLAN
Principal Discharge DX:	Sepsis secondary to UTI   1 Principal Discharge DX:	Sepsis secondary to UTI  Secondary Diagnosis:	Metabolic encephalopathy

## 2022-10-03 LAB
ANION GAP SERPL CALC-SCNC: 9 MMOL/L — SIGNIFICANT CHANGE UP (ref 5–17)
BUN SERPL-MCNC: 10.8 MG/DL — SIGNIFICANT CHANGE UP (ref 8–20)
CALCIUM SERPL-MCNC: 8.8 MG/DL — SIGNIFICANT CHANGE UP (ref 8.4–10.5)
CHLORIDE SERPL-SCNC: 102 MMOL/L — SIGNIFICANT CHANGE UP (ref 98–107)
CO2 SERPL-SCNC: 28 MMOL/L — SIGNIFICANT CHANGE UP (ref 22–29)
CREAT SERPL-MCNC: 0.77 MG/DL — SIGNIFICANT CHANGE UP (ref 0.5–1.3)
EGFR: 78 ML/MIN/1.73M2 — SIGNIFICANT CHANGE UP
GLUCOSE SERPL-MCNC: 102 MG/DL — HIGH (ref 70–99)
HCT VFR BLD CALC: 34.8 % — SIGNIFICANT CHANGE UP (ref 34.5–45)
HGB BLD-MCNC: 11.4 G/DL — LOW (ref 11.5–15.5)
MCHC RBC-ENTMCNC: 31.3 PG — SIGNIFICANT CHANGE UP (ref 27–34)
MCHC RBC-ENTMCNC: 32.8 GM/DL — SIGNIFICANT CHANGE UP (ref 32–36)
MCV RBC AUTO: 95.6 FL — SIGNIFICANT CHANGE UP (ref 80–100)
PLATELET # BLD AUTO: 647 K/UL — HIGH (ref 150–400)
POTASSIUM SERPL-MCNC: 4.6 MMOL/L — SIGNIFICANT CHANGE UP (ref 3.5–5.3)
POTASSIUM SERPL-SCNC: 4.6 MMOL/L — SIGNIFICANT CHANGE UP (ref 3.5–5.3)
RBC # BLD: 3.64 M/UL — LOW (ref 3.8–5.2)
RBC # FLD: 15.4 % — HIGH (ref 10.3–14.5)
SODIUM SERPL-SCNC: 138 MMOL/L — SIGNIFICANT CHANGE UP (ref 135–145)
WBC # BLD: 11.3 K/UL — HIGH (ref 3.8–10.5)
WBC # FLD AUTO: 11.3 K/UL — HIGH (ref 3.8–10.5)

## 2022-10-03 PROCEDURE — 99233 SBSQ HOSP IP/OBS HIGH 50: CPT

## 2022-10-03 RX ORDER — METHOTREXATE 2.5 MG/1
20 TABLET ORAL
Refills: 0 | Status: DISCONTINUED | OUTPATIENT
Start: 2022-10-03 | End: 2022-10-10

## 2022-10-03 RX ORDER — LISINOPRIL 2.5 MG/1
10 TABLET ORAL DAILY
Refills: 0 | Status: DISCONTINUED | OUTPATIENT
Start: 2022-10-03 | End: 2022-10-10

## 2022-10-03 RX ORDER — VANCOMYCIN HCL 1 G
125 VIAL (EA) INTRAVENOUS EVERY 12 HOURS
Refills: 0 | Status: DISCONTINUED | OUTPATIENT
Start: 2022-10-03 | End: 2022-10-06

## 2022-10-03 RX ORDER — OXYCODONE AND ACETAMINOPHEN 5; 325 MG/1; MG/1
1 TABLET ORAL ONCE
Refills: 0 | Status: DISCONTINUED | OUTPATIENT
Start: 2022-10-03 | End: 2022-10-03

## 2022-10-03 RX ORDER — PIPERACILLIN AND TAZOBACTAM 4; .5 G/20ML; G/20ML
3.38 INJECTION, POWDER, LYOPHILIZED, FOR SOLUTION INTRAVENOUS EVERY 8 HOURS
Refills: 0 | Status: COMPLETED | OUTPATIENT
Start: 2022-10-03 | End: 2022-10-05

## 2022-10-03 RX ORDER — FOLIC ACID 0.8 MG
1 TABLET ORAL DAILY
Refills: 0 | Status: DISCONTINUED | OUTPATIENT
Start: 2022-10-03 | End: 2022-10-10

## 2022-10-03 RX ORDER — ALPRAZOLAM 0.25 MG
0.5 TABLET ORAL
Refills: 0 | Status: DISCONTINUED | OUTPATIENT
Start: 2022-10-03 | End: 2022-10-08

## 2022-10-03 RX ADMIN — Medication 0.5 MILLIGRAM(S): at 10:16

## 2022-10-03 RX ADMIN — PIPERACILLIN AND TAZOBACTAM 25 GRAM(S): 4; .5 INJECTION, POWDER, LYOPHILIZED, FOR SOLUTION INTRAVENOUS at 02:23

## 2022-10-03 RX ADMIN — Medication 125 MILLIGRAM(S): at 23:21

## 2022-10-03 RX ADMIN — Medication 125 MILLIGRAM(S): at 10:23

## 2022-10-03 RX ADMIN — Medication 650 MILLIGRAM(S): at 23:24

## 2022-10-03 RX ADMIN — PIPERACILLIN AND TAZOBACTAM 25 GRAM(S): 4; .5 INJECTION, POWDER, LYOPHILIZED, FOR SOLUTION INTRAVENOUS at 09:24

## 2022-10-03 RX ADMIN — Medication 650 MILLIGRAM(S): at 23:30

## 2022-10-03 RX ADMIN — PIPERACILLIN AND TAZOBACTAM 25 GRAM(S): 4; .5 INJECTION, POWDER, LYOPHILIZED, FOR SOLUTION INTRAVENOUS at 17:16

## 2022-10-03 RX ADMIN — Medication 650 MILLIGRAM(S): at 21:47

## 2022-10-03 RX ADMIN — Medication 30 MILLILITER(S): at 09:24

## 2022-10-03 RX ADMIN — Medication 1 MILLIGRAM(S): at 12:42

## 2022-10-03 RX ADMIN — Medication 650 MILLIGRAM(S): at 07:48

## 2022-10-03 RX ADMIN — Medication 650 MILLIGRAM(S): at 04:36

## 2022-10-03 RX ADMIN — OXYCODONE AND ACETAMINOPHEN 1 TABLET(S): 5; 325 TABLET ORAL at 10:36

## 2022-10-03 RX ADMIN — LISINOPRIL 10 MILLIGRAM(S): 2.5 TABLET ORAL at 05:54

## 2022-10-03 NOTE — PROGRESS NOTE ADULT - ASSESSMENT
78 y/o female with PMH of RA, HTN, fibromyalgia, recurrent UTIs and C Diff  was [facility]. Started on PipTazo for AMS 2/2 UTI     Acute encephalopathy due to UTI   Resolved  Per family liekly has been having worsening dementia   UA noted (poor specimen); urine culture pending   Zosyn and Vancomycin once given in the ED  Started on PipTazo, will continue for now.   Start PO Vancomycin 125 mg Q 12 for Ppx given recent recurrent C Diff    HTN  Ramipril 2.5mg (equivalent to Lisinopril 10mg)    RA/Fibromyalgia   MTX 20mg q Friday   Folic acid   Avoid narcotics     Anxiety   Xanax 0.5mg bid PRN     Supportive   DVT prophylaxis: Heparin sc   Diet: DASH     Plan of care discussed with patient and son

## 2022-10-04 LAB
ANION GAP SERPL CALC-SCNC: 10 MMOL/L — SIGNIFICANT CHANGE UP (ref 5–17)
ANION GAP SERPL CALC-SCNC: 8 MMOL/L — SIGNIFICANT CHANGE UP (ref 5–17)
BUN SERPL-MCNC: 8 MG/DL — SIGNIFICANT CHANGE UP (ref 8–20)
BUN SERPL-MCNC: 9.6 MG/DL — SIGNIFICANT CHANGE UP (ref 8–20)
CALCIUM SERPL-MCNC: 8.5 MG/DL — SIGNIFICANT CHANGE UP (ref 8.4–10.5)
CALCIUM SERPL-MCNC: 8.6 MG/DL — SIGNIFICANT CHANGE UP (ref 8.4–10.5)
CHLORIDE SERPL-SCNC: 97 MMOL/L — LOW (ref 98–107)
CHLORIDE SERPL-SCNC: 99 MMOL/L — SIGNIFICANT CHANGE UP (ref 98–107)
CK SERPL-CCNC: 15 U/L — LOW (ref 25–170)
CO2 SERPL-SCNC: 26 MMOL/L — SIGNIFICANT CHANGE UP (ref 22–29)
CO2 SERPL-SCNC: 28 MMOL/L — SIGNIFICANT CHANGE UP (ref 22–29)
CREAT SERPL-MCNC: 0.67 MG/DL — SIGNIFICANT CHANGE UP (ref 0.5–1.3)
CREAT SERPL-MCNC: 0.72 MG/DL — SIGNIFICANT CHANGE UP (ref 0.5–1.3)
EGFR: 85 ML/MIN/1.73M2 — SIGNIFICANT CHANGE UP
EGFR: 89 ML/MIN/1.73M2 — SIGNIFICANT CHANGE UP
GLUCOSE SERPL-MCNC: 104 MG/DL — HIGH (ref 70–99)
GLUCOSE SERPL-MCNC: 99 MG/DL — SIGNIFICANT CHANGE UP (ref 70–99)
HCT VFR BLD CALC: 31.9 % — LOW (ref 34.5–45)
HCT VFR BLD CALC: 35.1 % — SIGNIFICANT CHANGE UP (ref 34.5–45)
HGB BLD-MCNC: 10.4 G/DL — LOW (ref 11.5–15.5)
HGB BLD-MCNC: 11.1 G/DL — LOW (ref 11.5–15.5)
LACTATE BLDV-MCNC: 1 MMOL/L — SIGNIFICANT CHANGE UP (ref 0.5–2)
MCHC RBC-ENTMCNC: 30.4 PG — SIGNIFICANT CHANGE UP (ref 27–34)
MCHC RBC-ENTMCNC: 30.7 PG — SIGNIFICANT CHANGE UP (ref 27–34)
MCHC RBC-ENTMCNC: 31.6 GM/DL — LOW (ref 32–36)
MCHC RBC-ENTMCNC: 32.6 GM/DL — SIGNIFICANT CHANGE UP (ref 32–36)
MCV RBC AUTO: 94.1 FL — SIGNIFICANT CHANGE UP (ref 80–100)
MCV RBC AUTO: 96.2 FL — SIGNIFICANT CHANGE UP (ref 80–100)
PLATELET # BLD AUTO: 636 K/UL — HIGH (ref 150–400)
PLATELET # BLD AUTO: 676 K/UL — HIGH (ref 150–400)
POTASSIUM SERPL-MCNC: 3.9 MMOL/L — SIGNIFICANT CHANGE UP (ref 3.5–5.3)
POTASSIUM SERPL-MCNC: 4.1 MMOL/L — SIGNIFICANT CHANGE UP (ref 3.5–5.3)
POTASSIUM SERPL-SCNC: 3.9 MMOL/L — SIGNIFICANT CHANGE UP (ref 3.5–5.3)
POTASSIUM SERPL-SCNC: 4.1 MMOL/L — SIGNIFICANT CHANGE UP (ref 3.5–5.3)
RBC # BLD: 3.39 M/UL — LOW (ref 3.8–5.2)
RBC # BLD: 3.65 M/UL — LOW (ref 3.8–5.2)
RBC # FLD: 15.8 % — HIGH (ref 10.3–14.5)
RBC # FLD: 15.9 % — HIGH (ref 10.3–14.5)
SODIUM SERPL-SCNC: 133 MMOL/L — LOW (ref 135–145)
SODIUM SERPL-SCNC: 135 MMOL/L — SIGNIFICANT CHANGE UP (ref 135–145)
TROPONIN T SERPL-MCNC: <0.01 NG/ML — SIGNIFICANT CHANGE UP (ref 0–0.06)
WBC # BLD: 11.71 K/UL — HIGH (ref 3.8–10.5)
WBC # BLD: 12.21 K/UL — HIGH (ref 3.8–10.5)
WBC # FLD AUTO: 11.71 K/UL — HIGH (ref 3.8–10.5)
WBC # FLD AUTO: 12.21 K/UL — HIGH (ref 3.8–10.5)

## 2022-10-04 PROCEDURE — 99232 SBSQ HOSP IP/OBS MODERATE 35: CPT

## 2022-10-04 PROCEDURE — 93010 ELECTROCARDIOGRAM REPORT: CPT

## 2022-10-04 RX ORDER — ACETAMINOPHEN 500 MG
1000 TABLET ORAL ONCE
Refills: 0 | Status: COMPLETED | OUTPATIENT
Start: 2022-10-04 | End: 2022-10-04

## 2022-10-04 RX ADMIN — Medication 650 MILLIGRAM(S): at 20:43

## 2022-10-04 RX ADMIN — Medication 1000 MILLIGRAM(S): at 11:29

## 2022-10-04 RX ADMIN — Medication 1000 MILLIGRAM(S): at 22:30

## 2022-10-04 RX ADMIN — Medication 30 MILLILITER(S): at 22:13

## 2022-10-04 RX ADMIN — Medication 400 MILLIGRAM(S): at 10:20

## 2022-10-04 RX ADMIN — Medication 1 MILLIGRAM(S): at 10:20

## 2022-10-04 RX ADMIN — PIPERACILLIN AND TAZOBACTAM 25 GRAM(S): 4; .5 INJECTION, POWDER, LYOPHILIZED, FOR SOLUTION INTRAVENOUS at 10:20

## 2022-10-04 RX ADMIN — Medication 125 MILLIGRAM(S): at 17:43

## 2022-10-04 RX ADMIN — Medication 125 MILLIGRAM(S): at 11:08

## 2022-10-04 RX ADMIN — Medication 650 MILLIGRAM(S): at 21:45

## 2022-10-04 RX ADMIN — Medication 0.5 MILLIGRAM(S): at 06:33

## 2022-10-04 RX ADMIN — Medication 400 MILLIGRAM(S): at 22:13

## 2022-10-04 RX ADMIN — Medication 0.5 MILLIGRAM(S): at 17:43

## 2022-10-04 RX ADMIN — LISINOPRIL 10 MILLIGRAM(S): 2.5 TABLET ORAL at 05:53

## 2022-10-04 RX ADMIN — PIPERACILLIN AND TAZOBACTAM 25 GRAM(S): 4; .5 INJECTION, POWDER, LYOPHILIZED, FOR SOLUTION INTRAVENOUS at 17:43

## 2022-10-04 RX ADMIN — Medication 650 MILLIGRAM(S): at 06:05

## 2022-10-04 RX ADMIN — Medication 30 MILLILITER(S): at 20:43

## 2022-10-04 RX ADMIN — Medication 650 MILLIGRAM(S): at 05:53

## 2022-10-04 RX ADMIN — PIPERACILLIN AND TAZOBACTAM 25 GRAM(S): 4; .5 INJECTION, POWDER, LYOPHILIZED, FOR SOLUTION INTRAVENOUS at 02:27

## 2022-10-04 NOTE — PROGRESS NOTE ADULT - ASSESSMENT
76 y/o female with PMH of RA, HTN, fibromyalgia, recurrent UTIs and C Diff  was sent to the ED from [facility] for AMS. Started on PipTazo for AMS 2/2 UTI     Acute encephalopathy due to UTI   Resolved  Per family lieamilcar has been having worsening dementia   Urine culture with E.Coli (prelim).   Zosyn and Vancomycin once given in the ED  Continue PipTazo, for now   PO Vancomycin 125 mg Q 12 for Ppx given recent recurrent C Diff    HTN  Ramipril 2.5mg (equivalent to Lisinopril 10mg)    RA/Fibromyalgia   MTX 20mg q Friday   Folic acid   Avoid narcotics     Anxiety   Xanax 0.5mg bid PRN     Supportive   DVT prophylaxis: Heparin sc   Diet: DASH     Plan of care discussed with patient and SW   76 y/o female with PMH of RA, HTN, fibromyalgia, recurrent UTIs and C Diff  was sent to the ED from [facility] for AMS. Started on PipTazo for AMS 2/2 UTI     Sepsis with acute encephalopathy due to UTI (present on admission)    Resolved  Per family lieamilcar has been having worsening dementia   Urine culture with E.Coli (prelim).   Zosyn and Vancomycin once given in the ED  Continue PipTazo, for now   PO Vancomycin 125 mg Q 12 for Ppx given recent recurrent C Diff    HTN  Ramipril 2.5mg (equivalent to Lisinopril 10mg)    RA/Fibromyalgia   MTX 20mg q Friday   Folic acid   Avoid narcotics     Anxiety   Xanax 0.5mg bid PRN     Supportive   DVT prophylaxis: Heparin sc   Diet: DASH     Plan of care discussed with patient and SW

## 2022-10-04 NOTE — CDI QUERY NOTE - NSCDIOTHERTXTBX_GEN_ALL_CORE_HH
Sepsis was noted in ED but is not mentioned in your progress notes. Can you please clarify the status of sepsis?  A.	Sepsis present on admission  B.	Sepsis ruled out  C.	Other, please specify  D.	Not clinically significant          ED ADULT Triage Note [Charted Location: [facility] ED] [Authored: 02-Oct-2022 18:20]  • Chief Complaint	altered mental status    xtended Triage:    Vital Signs:  • BP Systolic	128 mm Hg  • BP Diastolic	  55 mm Hg  • Heart Rate	  120 /min  • Respiration Rate (breaths/min)	20 /min  • Temp (F)	  100.6 Degrees F  • Temp (C)	  38.1 Degrees C         WBC count:  WBC Count: 12.21 K/uL (10.04.22 @ 05:45)   WBC Count: 11.30 K/uL (10.03.22 @ 02:49)   WBC Count: 13.21 K/uL (10.02.22 @ 18:35)       Blood Gas Venous - Lactate:  Blood Gas Venous - Lactate: 1.50 mmol/L (10.02.22 @ 18:25)         ED Provider Note [Charted Location: [facility] ERHR [id number]] [Authored: 02-Oct-2022 21:37]  Impression:  1.     Principal Discharge Dx Sepsis secondary to UTI.     Secondary Discharge Dx Metabolic encephalopathy.    Disposition:    SEPSIS – was this patient treated for sepsis? Yes.     Presentation suggestive of: Bacterial Etiology.         Progress Note Adult-Internal Medicine Attending [Charted Location: [facility] [id number]] [Authored: 04-Oct-2022 12:43]  Assessment and Plan:   • Assessment	  76 y/o female with PMH of RA, HTN, fibromyalgia, recurrent UTIs and C Diff  was sent [facility] the ED [facility]. Started on PipTazo for AMS 2/2 UTI     Acute encephalopathy due to UTI   Resolved  Per family lieamilcar has been having worsening dementia   Urine culture with E.Coli (prelim).   Zosyn and Vancomycin once given in the ED  Continue PipTazo, for now   PO Vancomycin 125 mg Q 12 for Ppx given recent recurrent C Diff

## 2022-10-04 NOTE — CHART NOTE - NSCHARTNOTEFT_GEN_A_CORE
76 y/o female with PMH of RA, HTN, fibromyalgia, recurrent UTIs and C Diff was sent to the ED from [facility] for AMS. Started on PipTazo for AMS 2/2 UTI. Patient started on PO Vancomycin 125 mg Q 12 for Ppx given recent recurrent C Diff.  Patient with complaint of abdominal pain, given PRN Tylenol PO and Simethicone with little relief   As per hospitalist note avoid narcotics   Patient seen at bedside, patient was hostile and angry  Patient states she has had this pain in the past related to the "infection in her stomach"   Patient states the pain is 10/10 without relieving factors  ROS: No chest pain, palpitations, SOB, light headedness, dizziness, headache, nausea/vomiting   Last BM was tonight     Vital Signs  T(F): 97.6   HR: 87   BP: 121/72  RR: 18    SpO2: 96%    Patient On (Oxygen Delivery Method): room air    GENERAL: NAD   HEAD: Atraumatic, Normocephalic  NERVOUS SYSTEM: Alert and awake, Good concentration  CHEST/LUNG: Clear to auscultation b/l; Unlabored respirations  HEART: S1S2+, Regular rate and rhythm  ABDOMEN: Soft, tender to palpation in epigastric region other nontender, +bowel sounds   EXTREMITIES: No LE edema, no tenderness to palpation of b/l LE  SKIN: Warm and dry    STAT EKG, abdominal XRAY, labs including lactate, cbc, bmp, trop   Ofirmev 1g x 1   Mylanta x1   RN to notify with any acute changes 78 y/o female with PMH of RA, HTN, fibromyalgia, recurrent UTIs and C Diff was sent to the ED [facility] for [facility]MS. Started on PipTazo for AMS 2/2 UTI. Patient started on PO Vancomycin 125 mg Q 12 for Ppx given recent recurrent C Diff.  Patient with complaint of abdominal pain, given PRN Tylenol PO and Simethicone with little relief   As per hospitalist note avoid narcotics   Patient seen at bedside, patient was hostile and angry  Patient states she has had this pain in the past related to the "infection in her stomach"   Patient states the pain is 10/10 without relieving factors  ROS: No chest pain, palpitations, SOB, light headedness, dizziness, headache, nausea/vomiting   Last BM was tonight     Vital Signs  T(F): 97.6   HR: 87   BP: 121/72  RR: 18    SpO2: 96%    Patient On (Oxygen Delivery Method): room air    GENERAL: NAD   HEAD: Atraumatic, Normocephalic  NERVOUS SYSTEM: Alert and awake, Good concentration  CHEST/LUNG: Clear to auscultation b/l; Unlabored respirations  HEART: S1S2+, Regular rate and rhythm  ABDOMEN: Soft, tender to palpation in epigastric region other nontender, +bowel sounds   EXTREMITIES: No LE edema, no tenderness to palpation of b/l LE  SKIN: Warm and dry    STAT EKG, abdominal XRAY, labs including Lactate, CBC, BMP, Trop, CK   Ofirmev 1g x 1   Mylanta x1   RN to notify with any acute changes 76 y/o female with PMH of RA, HTN, fibromyalgia, recurrent UTIs and C Diff was sent to the ED [facility] for [facility]MS. Started on PipTazo for AMS 2/2 UTI. Patient started on PO Vancomycin 125 mg Q 12 for Ppx given recent recurrent C Diff.  Patient with complaint of abdominal pain, given PRN Tylenol PO and Simethicone with little relief   As per hospitalist note avoid narcotics   Patient seen at bedside, patient was hostile and angry  Patient states she has had this pain in the past related to the "infection in her stomach"   Patient states the pain is 10/10 without relieving factors  ROS: No chest pain, palpitations, SOB, light headedness, dizziness, headache, nausea/vomiting   Last BM was tonight     Vital Signs  T(F): 97.6   HR: 87   BP: 121/72  RR: 18    SpO2: 96%    Patient On (Oxygen Delivery Method): room air    GENERAL: NAD   HEAD: Atraumatic, Normocephalic  NERVOUS SYSTEM: Alert and awake, Good concentration  CHEST/LUNG: Clear to auscultation b/l; Unlabored respirations  HEART: S1S2+, Regular rate and rhythm  ABDOMEN: Soft, tender to palpation in epigastric region other nontender, +bowel sounds   EXTREMITIES: No LE edema, no tenderness to palpation of b/l LE  SKIN: Warm and dry    STAT EKG, abdominal XRAY, labs including Lactate, CBC, BMP, Trop, CK   Ofirmev 1g x 1   Mylanta x1   RN to notify with any acute changes    Addendum 23:40   EKG with NSR, HR 82 no acute ST changes  Labs WNL   Patient now sleeping  Abdominal XRAY still pending 78 y/o female with PMH of RA, HTN, fibromyalgia, recurrent UTIs and C Diff was sent to the ED from [facility] for [facility]MS. Started on PipTazo for AMS 2/2 UTI. Patient started on PO Vancomycin 125 mg Q 12 for Ppx given recent recurrent C Diff.  Patient with complaint of abdominal pain, given PRN Tylenol PO and Simethicone with little relief   As per hospitalist note avoid narcotics   Patient seen at bedside, patient was hostile and angry  Patient states she has had this pain in the past related to the "infection in her stomach"   Patient states the pain is 10/10 without relieving factors  ROS: No chest pain, palpitations, SOB, light headedness, dizziness, headache, nausea/vomiting   Last BM was tonight     Vital Signs  T(F): 97.6   HR: 87   BP: 121/72  RR: 18    SpO2: 96%    Patient On (Oxygen Delivery Method): room air    GENERAL: NAD   HEAD: Atraumatic, Normocephalic  NERVOUS SYSTEM: Alert and awake, Good concentration  CHEST/LUNG: Clear to auscultation b/l; Unlabored respirations  HEART: S1S2+, Regular rate and rhythm  ABDOMEN: Soft, tender to palpation in epigastric region other nontender, +bowel sounds   EXTREMITIES: No LE edema, no tenderness to palpation of b/l LE  SKIN: Warm and dry    STAT EKG, abdominal XRAY, labs including Lactate, CBC, BMP, Trop, CK   Ofirmev 1g x 1   Mylanta x1       Addendum 23:40   EKG with NSR, HR 82 no acute ST changes  Labs WNL   Patient now sleeping  Abdominal XRAY still pending    Addendum 00:20   Transport came to  patient for abdominal XRAY  Patient with extreme agitation, yelling and screaming   Patient refusing abdominal XRAY at this time   Patient still complaining of pain, Toradol 15mg x1  RN to notify with any acute changes

## 2022-10-05 ENCOUNTER — TRANSCRIPTION ENCOUNTER (OUTPATIENT)
Age: 77
End: 2022-10-05

## 2022-10-05 DIAGNOSIS — F32.A DEPRESSION, UNSPECIFIED: ICD-10-CM

## 2022-10-05 PROCEDURE — 99232 SBSQ HOSP IP/OBS MODERATE 35: CPT

## 2022-10-05 PROCEDURE — 99223 1ST HOSP IP/OBS HIGH 75: CPT

## 2022-10-05 PROCEDURE — 74176 CT ABD & PELVIS W/O CONTRAST: CPT | Mod: 26

## 2022-10-05 RX ORDER — KETOROLAC TROMETHAMINE 30 MG/ML
15 SYRINGE (ML) INJECTION ONCE
Refills: 0 | Status: DISCONTINUED | OUTPATIENT
Start: 2022-10-05 | End: 2022-10-05

## 2022-10-05 RX ORDER — OLANZAPINE 15 MG/1
2.5 TABLET, FILM COATED ORAL AT BEDTIME
Refills: 0 | Status: DISCONTINUED | OUTPATIENT
Start: 2022-10-05 | End: 2022-10-06

## 2022-10-05 RX ORDER — ACETAMINOPHEN 500 MG
1000 TABLET ORAL ONCE
Refills: 0 | Status: COMPLETED | OUTPATIENT
Start: 2022-10-05 | End: 2022-10-05

## 2022-10-05 RX ADMIN — Medication 125 MILLIGRAM(S): at 17:52

## 2022-10-05 RX ADMIN — Medication 3 MILLIGRAM(S): at 22:22

## 2022-10-05 RX ADMIN — Medication 125 MILLIGRAM(S): at 06:59

## 2022-10-05 RX ADMIN — Medication 1 MILLIGRAM(S): at 10:10

## 2022-10-05 RX ADMIN — OLANZAPINE 2.5 MILLIGRAM(S): 15 TABLET, FILM COATED ORAL at 22:19

## 2022-10-05 RX ADMIN — Medication 0.5 MILLIGRAM(S): at 00:59

## 2022-10-05 RX ADMIN — Medication 15 MILLIGRAM(S): at 00:50

## 2022-10-05 RX ADMIN — Medication 1000 MILLIGRAM(S): at 22:15

## 2022-10-05 RX ADMIN — Medication 400 MILLIGRAM(S): at 19:42

## 2022-10-05 RX ADMIN — PIPERACILLIN AND TAZOBACTAM 25 GRAM(S): 4; .5 INJECTION, POWDER, LYOPHILIZED, FOR SOLUTION INTRAVENOUS at 10:07

## 2022-10-05 RX ADMIN — Medication 15 MILLIGRAM(S): at 00:35

## 2022-10-05 RX ADMIN — Medication 0.5 MILLIGRAM(S): at 13:51

## 2022-10-05 RX ADMIN — PIPERACILLIN AND TAZOBACTAM 25 GRAM(S): 4; .5 INJECTION, POWDER, LYOPHILIZED, FOR SOLUTION INTRAVENOUS at 17:52

## 2022-10-05 RX ADMIN — PIPERACILLIN AND TAZOBACTAM 25 GRAM(S): 4; .5 INJECTION, POWDER, LYOPHILIZED, FOR SOLUTION INTRAVENOUS at 00:34

## 2022-10-05 RX ADMIN — Medication 30 MILLILITER(S): at 17:56

## 2022-10-05 NOTE — DISCHARGE NOTE PROVIDER - NSDCMRMEDTOKEN_GEN_ALL_CORE_FT
ALPRAZolam 0.5 mg oral tablet: 1 tab(s) orally 2 times a day, As Needed  biotin 5000 mcg oral capsule: 1 cap(s) orally once a day  cyanocobalamin 5000 mcg sublingual tablet: 1 tab(s) sublingual once a day  folic acid 1 mg oral tablet: 1 tab(s) orally once a day  methotrexate 2.5 mg oral tablet: 8 tab(s) orally every 7 days  on fridays  ramipril 2.5 mg oral capsule: 1 cap(s) orally once a day  triamcinolone 0.1% topical cream: Apply topically to affected area 2 times a day, As Needed  Vascepa 1 g oral capsule: 2 cap(s) orally 2 times a day  Vitamin D2 1.25 mg (50,000 intl units) oral capsule: 1 cap(s) orally 2 times a week   ALPRAZolam 0.5 mg oral tablet: 1 tab(s) orally 2 times a day, As Needed  biotin 5000 mcg oral capsule: 1 cap(s) orally once a day  cyanocobalamin 5000 mcg sublingual tablet: 1 tab(s) sublingual once a day  folic acid 1 mg oral tablet: 1 tab(s) orally once a day  methotrexate 2.5 mg oral tablet: 8 tab(s) orally every 7 days  on fridays  OLANZapine 2.5 mg oral tablet: 1 tab(s) orally once a day 8AM  OLANZapine 5 mg oral tablet: 1 tab(s) orally once a day at noon  ramipril 2.5 mg oral capsule: 1 cap(s) orally once a day  triamcinolone 0.1% topical cream: Apply topically to affected area 2 times a day, As Needed  Vascepa 1 g oral capsule: 2 cap(s) orally 2 times a day  Vitamin D2 1.25 mg (50,000 intl units) oral capsule: 1 cap(s) orally 2 times a week

## 2022-10-05 NOTE — DISCHARGE NOTE PROVIDER - HOSPITAL COURSE
78 y/o female with PMH of RA, HTN, fibromyalgia, recurrent UTIs with reported recurrent UTIs and C Diff was sent to the ED [facility] for [facility]. Started on PipTazo for AMS 2/2 UTI. She has had progression of her underlying cognitive impairment as per her son and has been observed to be erratic in behavior. Due to her UTI she was started on PipTazo (with prophylactic PO Vancomycin given her C Diff history). 76 y/o female with PMH of RA, HTN, fibromyalgia, recurrent UTIs with reported recurrent UTIs and C Diff was sent to the ED from [facility] for [facility]MS. She was admitted for sepsis with AMS 2/2 UTI and was treated with zosyn (with prophylactic PO Vancomycin given her C Diff history).  She has had progression of her underlying cognitive impairment as per her son and has been observed to be erratic in behavior. She was seen by behavioral health for persistent delirium due to acute illness and has improved with ongoing medical improvement and was cleared with no psych contraindications for discharge. Based on family's history, likely has worsening dementia as well. Patient is medically cleared for discharge to subacute rehab.

## 2022-10-05 NOTE — BH CONSULTATION LIAISON ASSESSMENT NOTE - NSBHCHARTREVIEWINVESTIGATE_PSY_A_CORE FT
< from: 12 Lead ECG (10.04.22 @ 22:40) >      Ventricular Rate 82 BPM    Atrial Rate 82 BPM    P-R Interval 170 ms    QRS Duration 86 ms    Q-T Interval 364 ms    QTC Calculation(Bazett) 425 ms    P Axis 75 degrees    R Axis 39 degrees    T Axis 40 degrees    Diagnosis Line Sinus rhythm with marked sinus arrhythmia    Confirmed by ALMA SCHRADER ([id number]) on 10/5/2022 3:43:15 PM    < end of copied text >

## 2022-10-05 NOTE — BH CONSULTATION LIAISON ASSESSMENT NOTE - NSBHCHARTREVIEWLAB_PSY_A_CORE FT
Basic Metabolic Panel - STAT (10.04.22 @ 22:32)    Sodium, Serum: 133 mmol/L    Potassium, Serum: 3.9 mmol/L    Chloride, Serum: 97 mmol/L    Carbon Dioxide, Serum: 28.0 mmol/L    Anion Gap, Serum: 8 mmol/L    Blood Urea Nitrogen, Serum: 9.6 mg/dL    Creatinine, Serum: 0.72 mg/dL    Glucose, Serum: 104 mg/dL    Calcium, Total Serum: 8.5: Prior Reference Range of 8.6 – 10.2 was amended as of 7/26/2022 to 8.4 –  10.5. mg/dL    eGFR: 85: The estimated glomerular filtration rate (eGFR) is calculated using the  2021 CKD-EPI creatinine equation, which does not have a coefficient for  race and is validated in individuals 18 years of age and older (N Engl J  Med 2021; 385:4254-2438). Creatinine-based eGFR may be inaccurate in  various situations including but not limited to extremes of muscle mass,  altered dietary protein intake, or medications that affect renal tubular  creatinine secretion. mL/min/1.73m2

## 2022-10-05 NOTE — DISCHARGE NOTE PROVIDER - ATTENDING DISCHARGE PHYSICAL EXAMINATION:
Vital Signs Last 24 Hrs  T(F): 98.4 (10 Oct 2022 11:31), Max: 99.2 (09 Oct 2022 20:00)  HR: 88 (10 Oct 2022 11:31) (87 - 101)  BP: 112/65 (10 Oct 2022 11:31) (96/62 - 137/63)  RR: 18 (10 Oct 2022 11:31) (18 - 18)  SpO2: 93% (10 Oct 2022 11:31) (92% - 94%)    Physical Exam:  Constitutional: NAD  HEENT: NC/AT, PERRL, EOMI, trachea midline, no JVD  Respiratory: CTA bilaterally, symmetrical chest rise  Cardiovascular: RRR, no m/g/r  Gastrointestinal: Soft, NT/ND, BS+  Vascular: 2+ peripheral pulses  Neurological: A/O x 3, no focal neurological deficits  Psych: Fair mood/affect  Musculoskeletal: No edema, cyanosis, deformities. ROM normal  Skin: No obvious rash, lesions. No jaundice.

## 2022-10-05 NOTE — BH CONSULTATION LIAISON ASSESSMENT NOTE - NSBHMSEAFFQUAL_PSY_A_CORE
Impression: Lattice degeneration of retina, right eye: H35.411. Plan: superior and inferior lattice, disc possible future prophylactic laser. Impression: Retinal detachment with single break, left eye: H33.012. Plan: s/p PPV Laser Sf6 OS. attached, gas bubble resolving. monitor RTC 1 month [place] Lot #: [id number] Document As Units Or Cc?: units Depressor Anguli Oris Units: 0 Price (Use Numbers Only, No Special Characters Or $): 420 Consent: Written consent obtained. Risks include but not limited to lid/brow ptosis, bruising, swelling, diplopia, temporary effect, incomplete chemical denervation. Forehead Units/Cc: 8 Post-Care Instructions: Patient instructed to not lie down for 4 hours and limit physical activity for 24 hours. Detail Level: Detailed Glabellar Complex Units: 20 Dilution (U/0.1 Cc): 1 Irritable

## 2022-10-05 NOTE — BH CONSULTATION LIAISON ASSESSMENT NOTE - NSELEVCHRSUICRISK_PSY_ALL_CORE
Ankle fracture  ? At age 11 - no surgical intervention  Chronic sinusitis, unspecified location    Displaced trimalleolar fracture of left lower leg, initial encounter for closed fracture    Enlarged tonsils
No

## 2022-10-05 NOTE — BH CONSULTATION LIAISON ASSESSMENT NOTE - SUMMARY
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   Writer contacted by NP     Recs.   Maintain delirium precautions   Avoid anticholinergic agents   Frequent re orientation, hydration, PT when possible,   EKg reviewed   Recommend to start ZYprexa 2.5mg PO at bedtime   For breakthrough agitation can give additional Zyprexa 2.5 - 5 mg PO or IM Q6 hours PRN   Will follow

## 2022-10-05 NOTE — BH CONSULTATION LIAISON ASSESSMENT NOTE - HPI (INCLUDE ILLNESS QUALITY, SEVERITY, DURATION, TIMING, CONTEXT, MODIFYING FACTORS, ASSOCIATED SIGNS AND SYMPTOMS)
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 Patient irritable and labile throughout eval and stating she does not want to go back to rehab and wants to go home. Patient continuing to yell and needing frequent redirection. She denies any mental health history despite collateral from daughter. Patient denies any s/h ideation and denies symptoms of martni or AVH   Cognition: patient oriented x3   3 word recall : 2/3---> 1/3   Days of week backwards: 7/7   Months of year backwards: 7/12     collateral info taken from patients daughter Naya who provided majority of history ( [phone]). daughter states patient at baseline can be irritable and is a "narcissist" and explains that at baseline she is alert, oriented x3 and lives independently, driving and managing bills but since this last UTI, she has periods of confusion that fluctuates and with periods of agitation.

## 2022-10-05 NOTE — BH CONSULTATION LIAISON ASSESSMENT NOTE - CURRENT MEDICATION
MEDICATIONS  (STANDING):  folic acid 1 milliGRAM(s) Oral daily  lisinopril 10 milliGRAM(s) Oral daily  methotrexate 20 milliGRAM(s) Oral <User Schedule>  piperacillin/tazobactam IVPB.. 3.375 Gram(s) IV Intermittent every 8 hours  vancomycin    Solution 125 milliGRAM(s) Oral every 12 hours    MEDICATIONS  (PRN):  acetaminophen     Tablet .. 650 milliGRAM(s) Oral every 6 hours PRN Temp greater or equal to 38C (100.4F), Mild Pain (1 - 3)  ALPRAZolam 0.5 milliGRAM(s) Oral two times a day PRN anxiety  aluminum hydroxide/magnesium hydroxide/simethicone Suspension 30 milliLiter(s) Oral every 4 hours PRN Dyspepsia  melatonin 3 milliGRAM(s) Oral at bedtime PRN Insomnia  ondansetron Injectable 4 milliGRAM(s) IV Push every 8 hours PRN Nausea and/or Vomiting

## 2022-10-05 NOTE — CHART NOTE - NSCHARTNOTEFT_GEN_A_CORE
RN called to report patient making threats to kill herself  Patient currently admitted with UTI /AMS /agitation   Behavioral Health was consulted earlier on 10/5 for increased agitation  Patient has xanax 0.5mg PO standing order PRN anxiety request for RN to give PRN for agitation   Vital Signs Last 24 Hrs  T(C): 36.2 (05 Oct 2022 04:42), Max: 36.7 (05 Oct 2022 00:52)  T(F): 97.2 (05 Oct 2022 04:42), Max: 98 (05 Oct 2022 00:52)  HR: 82 (05 Oct 2022 04:42) (78 - 87)  BP: 106/57 (05 Oct 2022 04:42) (106/57 - 121/72)  BP(mean): --  RR: 18 (05 Oct 2022 04:42) (18 - 18)  SpO2: 97% (05 Oct 2022 04:42) (96% - 98%)    Parameters below as of 05 Oct 2022 04:42  Patient On (Oxygen Delivery Method): room air        Plan:   constant observation ordered until  evaluation for patient safety's  Discussed Plan of Care with hospitalist in agreement RN called to report patient making threats to kill herself  Patient currently admitted with UTI /AMS /agitation   Behavioral Health was consulted earlier on 10/5 for increased agitation  Patient has xanax 0.5mg PO standing order PRN anxiety request for RN to give PRN for agitation   Vital Signs Last 24 Hrs  T(C): 36.2 (05 Oct 2022 04:42), Max: 36.7 (05 Oct 2022 00:52)  T(F): 97.2 (05 Oct 2022 04:42), Max: 98 (05 Oct 2022 00:52)  HR: 82 (05 Oct 2022 04:42) (78 - 87)  BP: 106/57 (05 Oct 2022 04:42) (106/57 - 121/72)  BP(mean): --  RR: 18 (05 Oct 2022 04:42) (18 - 18)  SpO2: 97% (05 Oct 2022 04:42) (96% - 98%)    Parameters below as of 05 Oct 2022 04:42  Patient On (Oxygen Delivery Method): room air    Patient seen at bedside, states she is not really going to kill herself but if she goes to a NH she will.     Plan:   constant observation ordered until  evaluation for patient safety's  Discussed with RN and Nurse manager will call  to discuss SI threat  Discussed Plan of Care with hospitalist in agreement

## 2022-10-05 NOTE — PHYSICAL THERAPY INITIAL EVALUATION ADULT - AMBULATION SKILLS, REHAB EVAL
[FreeTextEntry8] : 28M presents with left breast nodule for 1 year. Denies rash, pain, discharge. 
rollator, to be delivered at bedside
independent

## 2022-10-05 NOTE — PROGRESS NOTE ADULT - ASSESSMENT
76 y/o female with PMH of RA, HTN, fibromyalgia, recurrent UTIs and C Diff  was sent to the ED [facility] for [facility]. Started on PipTazo for AMS 2/2 UTI     Sepsis with acute encephalopathy due to UTI (present on admission)    Resolved  Per family liekldionte has been having worsening dementia   Urine culture with E.Coli (prelim).   Zosyn and Vancomycin once given in the ED  Continue PipTazo, for now   PO Vancomycin 125 mg Q 12 for Ppx given recent recurrent C Diff    Suspected dementia  Lack of formal diagnosis  Per collateral with family likely has been progressing for some time   d/w psych, suspect may have been masking for some time but now unable making appearance of acute decline  Start zyprexa QHS   1:1 for suicidal statements    HTN  Ramipril 2.5mg (equivalent to Lisinopril 10mg)    RA/Fibromyalgia   MTX 20mg q Friday   Folic acid   Avoid narcotics     Anxiety   Xanax 0.5mg bid PRN     Supportive   DVT prophylaxis: Heparin sc   Diet: DASH     Plan of care discussed with patient and SW   78 y/o female with PMH of RA, HTN, fibromyalgia, recurrent UTIs and C Diff  was sent to the ED [facility] for [facility]. Started on PipTazo for AMS 2/2 UTI     Sepsis with acute encephalopathy due to UTI (present on admission)    Resolved  Per family liekldionte has been having worsening dementia   Urine culture with E.Coli (prelim).   Zosyn and Vancomycin once given in the ED  Continue PipTazo, for now (CT confirming non obstructing renal stones)   PO Vancomycin 125 mg Q 12 for Ppx given recent recurrent C Diff    Suspected dementia  Lack of formal diagnosis  Per collateral with family likely has been progressing for some time   d/w psych, suspect may have been masking for some time but now unable making appearance of acute decline  Start zyprexa QHS   1:1 for suicidal statements    HTN  Ramipril 2.5mg (equivalent to Lisinopril 10mg)    RA/Fibromyalgia   MTX 20mg q Friday   Folic acid   Avoid narcotics     Anxiety   Xanax 0.5mg bid PRN     Supportive   DVT prophylaxis: Heparin sc   Diet: DASH     Plan of care discussed with patient and SW

## 2022-10-05 NOTE — BH CONSULTATION LIAISON ASSESSMENT NOTE - NSBHCHARTREVIEWVS_PSY_A_CORE FT
Vital Signs Last 24 Hrs  T(C): 36.2 (05 Oct 2022 04:42), Max: 36.7 (05 Oct 2022 00:52)  T(F): 97.2 (05 Oct 2022 04:42), Max: 98 (05 Oct 2022 00:52)  HR: 82 (05 Oct 2022 04:42) (78 - 87)  BP: 106/57 (05 Oct 2022 04:42) (106/57 - 121/72)  BP(mean): --  RR: 18 (05 Oct 2022 04:42) (18 - 18)  SpO2: 97% (05 Oct 2022 04:42) (96% - 98%)    Parameters below as of 05 Oct 2022 04:42  Patient On (Oxygen Delivery Method): room air

## 2022-10-05 NOTE — PHYSICAL THERAPY INITIAL EVALUATION ADULT - ADDITIONAL COMMENTS
Pt previously independent, lives alone and does not use or own an AD at baseline. Pt declined to give additional social history.

## 2022-10-05 NOTE — DISCHARGE NOTE PROVIDER - NSDCCPCAREPLAN_GEN_ALL_CORE_FT
PRINCIPAL DISCHARGE DIAGNOSIS  Diagnosis: Sepsis secondary to UTI  Assessment and Plan of Treatment: Resolved      SECONDARY DISCHARGE DIAGNOSES  Diagnosis: Metabolic encephalopathy  Assessment and Plan of Treatment: Resolved    Diagnosis: Dementia  Assessment and Plan of Treatment: Continue zyprexa 2.5mg at 8 AM, 5mg at 12 pm

## 2022-10-06 LAB
ANION GAP SERPL CALC-SCNC: 10 MMOL/L — SIGNIFICANT CHANGE UP (ref 5–17)
BUN SERPL-MCNC: 7.3 MG/DL — LOW (ref 8–20)
CALCIUM SERPL-MCNC: 8.9 MG/DL — SIGNIFICANT CHANGE UP (ref 8.4–10.5)
CHLORIDE SERPL-SCNC: 104 MMOL/L — SIGNIFICANT CHANGE UP (ref 98–107)
CO2 SERPL-SCNC: 27 MMOL/L — SIGNIFICANT CHANGE UP (ref 22–29)
CREAT SERPL-MCNC: 0.69 MG/DL — SIGNIFICANT CHANGE UP (ref 0.5–1.3)
EGFR: 88 ML/MIN/1.73M2 — SIGNIFICANT CHANGE UP
GLUCOSE SERPL-MCNC: 83 MG/DL — SIGNIFICANT CHANGE UP (ref 70–99)
HCT VFR BLD CALC: 35.1 % — SIGNIFICANT CHANGE UP (ref 34.5–45)
HGB BLD-MCNC: 11.3 G/DL — LOW (ref 11.5–15.5)
MCHC RBC-ENTMCNC: 31.1 PG — SIGNIFICANT CHANGE UP (ref 27–34)
MCHC RBC-ENTMCNC: 32.2 GM/DL — SIGNIFICANT CHANGE UP (ref 32–36)
MCV RBC AUTO: 96.7 FL — SIGNIFICANT CHANGE UP (ref 80–100)
PLATELET # BLD AUTO: 641 K/UL — HIGH (ref 150–400)
POTASSIUM SERPL-MCNC: 3.9 MMOL/L — SIGNIFICANT CHANGE UP (ref 3.5–5.3)
POTASSIUM SERPL-SCNC: 3.9 MMOL/L — SIGNIFICANT CHANGE UP (ref 3.5–5.3)
RBC # BLD: 3.63 M/UL — LOW (ref 3.8–5.2)
RBC # FLD: 15.9 % — HIGH (ref 10.3–14.5)
SODIUM SERPL-SCNC: 141 MMOL/L — SIGNIFICANT CHANGE UP (ref 135–145)
WBC # BLD: 8.42 K/UL — SIGNIFICANT CHANGE UP (ref 3.8–10.5)
WBC # FLD AUTO: 8.42 K/UL — SIGNIFICANT CHANGE UP (ref 3.8–10.5)

## 2022-10-06 PROCEDURE — 99233 SBSQ HOSP IP/OBS HIGH 50: CPT

## 2022-10-06 RX ORDER — INFLUENZA VIRUS VACCINE 15; 15; 15; 15 UG/.5ML; UG/.5ML; UG/.5ML; UG/.5ML
0.7 SUSPENSION INTRAMUSCULAR ONCE
Refills: 0 | Status: DISCONTINUED | OUTPATIENT
Start: 2022-10-06 | End: 2022-10-10

## 2022-10-06 RX ORDER — OLANZAPINE 15 MG/1
5 TABLET, FILM COATED ORAL DAILY
Refills: 0 | Status: DISCONTINUED | OUTPATIENT
Start: 2022-10-06 | End: 2022-10-10

## 2022-10-06 RX ORDER — OLANZAPINE 15 MG/1
2.5 TABLET, FILM COATED ORAL
Refills: 0 | Status: DISCONTINUED | OUTPATIENT
Start: 2022-10-06 | End: 2022-10-10

## 2022-10-06 RX ADMIN — Medication 1 MILLIGRAM(S): at 17:09

## 2022-10-06 RX ADMIN — Medication 125 MILLIGRAM(S): at 05:58

## 2022-10-06 RX ADMIN — OLANZAPINE 5 MILLIGRAM(S): 15 TABLET, FILM COATED ORAL at 21:09

## 2022-10-06 RX ADMIN — Medication 0.5 MILLIGRAM(S): at 14:12

## 2022-10-06 RX ADMIN — Medication 650 MILLIGRAM(S): at 10:30

## 2022-10-06 RX ADMIN — Medication 30 MILLILITER(S): at 09:26

## 2022-10-06 RX ADMIN — LISINOPRIL 10 MILLIGRAM(S): 2.5 TABLET ORAL at 05:58

## 2022-10-06 RX ADMIN — Medication 650 MILLIGRAM(S): at 09:26

## 2022-10-06 NOTE — PROGRESS NOTE ADULT - ASSESSMENT
76 y/o female with PMH of RA, HTN, fibromyalgia, recurrent UTIs and C Diff  was sent to the ED [facility]. Started on PipTazo for AMS 2/2 UTI     Sepsis with acute encephalopathy due to UTI (present on admission)    Resolved  Per family lexy has been having worsening dementia   Urine culture with E.Coli  Completed course of PipTazo with PO vanc for  C Diff ppx     Suspected dementia  Lack of formal diagnosis  Per collateral with family likely has been progressing for some time   Stable to come off 1:1  Titrate Zyprexa, 2.5mg am, 5 mg pm   d/w Psych, appreciate recs     HTN  Ramipril 2.5mg (equivalent to Lisinopril 10mg)    RA/Fibromyalgia   MTX 20mg q Friday   Folic acid   Avoid narcotics     Anxiety   Xanax 0.5mg bid PRN     Supportive   DVT prophylaxis: Heparin sc   Diet: DASH     Plan of care discussed with patient and psych and SW

## 2022-10-06 NOTE — PATIENT PROFILE ADULT - FALL HARM RISK - HARM RISK INTERVENTIONS
Assistance with ambulation/Assistance OOB with selected safe patient handling equipment/Communicate Risk of Fall with Harm to all staff/Discuss with provider need for PT consult/Monitor gait and stability/Reinforce activity limits and safety measures with patient and family/Tailored Fall Risk Interventions/Visual Cue: Yellow wristband and red socks/Bed in lowest position, wheels locked, appropriate side rails in place/Call bell, personal items and telephone in reach/Instruct patient to call for assistance before getting out of bed or chair/Non-slip footwear when patient is out of bed/[place] to call system/Physically safe environment - no spills, clutter or unnecessary equipment/Purposeful Proactive Rounding/Room/bathroom lighting operational, light cord in reach

## 2022-10-07 LAB — SARS-COV-2 RNA SPEC QL NAA+PROBE: SIGNIFICANT CHANGE UP

## 2022-10-07 PROCEDURE — 99232 SBSQ HOSP IP/OBS MODERATE 35: CPT

## 2022-10-07 PROCEDURE — 99233 SBSQ HOSP IP/OBS HIGH 50: CPT

## 2022-10-07 RX ADMIN — Medication 30 MILLILITER(S): at 17:35

## 2022-10-07 RX ADMIN — Medication 1 MILLIGRAM(S): at 11:11

## 2022-10-07 RX ADMIN — METHOTREXATE 20 MILLIGRAM(S): 2.5 TABLET ORAL at 11:11

## 2022-10-07 RX ADMIN — OLANZAPINE 5 MILLIGRAM(S): 15 TABLET, FILM COATED ORAL at 11:10

## 2022-10-07 RX ADMIN — LISINOPRIL 10 MILLIGRAM(S): 2.5 TABLET ORAL at 06:02

## 2022-10-07 RX ADMIN — OLANZAPINE 2.5 MILLIGRAM(S): 15 TABLET, FILM COATED ORAL at 08:32

## 2022-10-07 RX ADMIN — Medication 0.5 MILLIGRAM(S): at 17:35

## 2022-10-07 RX ADMIN — Medication 30 MILLILITER(S): at 10:24

## 2022-10-07 NOTE — BH CONSULTATION LIAISON PROGRESS NOTE - CURRENT MEDICATION
MEDICATIONS  (STANDING):  folic acid 1 milliGRAM(s) Oral daily  influenza  Vaccine (HIGH DOSE) 0.7 milliLiter(s) IntraMuscular once  lisinopril 10 milliGRAM(s) Oral daily  methotrexate 20 milliGRAM(s) Oral <User Schedule>  OLANZapine 2.5 milliGRAM(s) Oral <User Schedule>  OLANZapine 5 milliGRAM(s) Oral daily    MEDICATIONS  (PRN):  acetaminophen     Tablet .. 650 milliGRAM(s) Oral every 6 hours PRN Temp greater or equal to 38C (100.4F), Mild Pain (1 - 3)  ALPRAZolam 0.5 milliGRAM(s) Oral two times a day PRN anxiety  aluminum hydroxide/magnesium hydroxide/simethicone Suspension 30 milliLiter(s) Oral every 4 hours PRN Dyspepsia  melatonin 3 milliGRAM(s) Oral at bedtime PRN Insomnia  ondansetron Injectable 4 milliGRAM(s) IV Push every 8 hours PRN Nausea and/or Vomiting  
MEDICATIONS  (STANDING):  folic acid 1 milliGRAM(s) Oral daily  lisinopril 10 milliGRAM(s) Oral daily  methotrexate 20 milliGRAM(s) Oral <User Schedule>  OLANZapine 2.5 milliGRAM(s) Oral at bedtime  vancomycin    Solution 125 milliGRAM(s) Oral every 12 hours    MEDICATIONS  (PRN):  acetaminophen     Tablet .. 650 milliGRAM(s) Oral every 6 hours PRN Temp greater or equal to 38C (100.4F), Mild Pain (1 - 3)  ALPRAZolam 0.5 milliGRAM(s) Oral two times a day PRN anxiety  aluminum hydroxide/magnesium hydroxide/simethicone Suspension 30 milliLiter(s) Oral every 4 hours PRN Dyspepsia  melatonin 3 milliGRAM(s) Oral at bedtime PRN Insomnia  ondansetron Injectable 4 milliGRAM(s) IV Push every 8 hours PRN Nausea and/or Vomiting

## 2022-10-07 NOTE — BH CONSULTATION LIAISON PROGRESS NOTE - NSBHCHARTREVIEWVS_PSY_A_CORE FT
Vital Signs Last 24 Hrs  T(C): 36.7 (07 Oct 2022 05:04), Max: 36.8 (06 Oct 2022 20:00)  T(F): 98 (07 Oct 2022 05:04), Max: 98.2 (06 Oct 2022 20:00)  HR: 87 (07 Oct 2022 05:04) (80 - 87)  BP: 106/72 (07 Oct 2022 05:04) (100/66 - 112/64)  BP(mean): --  RR: 16 (07 Oct 2022 05:04) (16 - 18)  SpO2: 96% (07 Oct 2022 05:04) (96% - 97%)    Parameters below as of 07 Oct 2022 05:04  Patient On (Oxygen Delivery Method): room air    
Vital Signs Last 24 Hrs  T(C): 36.5 (06 Oct 2022 10:02), Max: 36.5 (06 Oct 2022 04:41)  T(F): 97.7 (06 Oct 2022 10:02), Max: 97.7 (06 Oct 2022 04:41)  HR: 92 (06 Oct 2022 10:02) (82 - 92)  BP: 100/61 (06 Oct 2022 10:02) (100/61 - 136/79)  BP(mean): --  RR: 18 (06 Oct 2022 10:02) (18 - 18)  SpO2: 96% (06 Oct 2022 10:02) (92% - 96%)    Parameters below as of 06 Oct 2022 10:02  Patient On (Oxygen Delivery Method): room air

## 2022-10-07 NOTE — PROGRESS NOTE ADULT - ASSESSMENT
78 y/o female with PMH of RA, HTN, fibromyalgia, recurrent UTIs and C Diff  was sent to the ED from [facility] for [facility]MS. Started on PipTazo for AMS 2/2 UTI     Sepsis with acute encephalopathy due to UTI (present on admission)    Resolved  Per family lexy has been having worsening dementia   Urine culture with E.Coli  Completed course of PipTazo with PO vanc for  C Diff ppx     Suspected dementia  Lack of formal diagnosis  Per collateral with family likely has been progressing for some time   Off 1:1  Titrate Zyprexa, 2.5mg am, 5 mg pm   Markedly improved behaviour. Pleasant   d/w Psych, appreciate recs     HTN  Ramipril 2.5mg (equivalent to Lisinopril 10mg)    RA/Fibromyalgia   MTX 20mg q Friday   Folic acid   Avoid narcotics     Anxiety   Xanax 0.5mg bid PRN     Supportive   DVT prophylaxis: Heparin sc   Diet: DASH     Plan of care discussed with patient and psych and SW

## 2022-10-07 NOTE — BH CONSULTATION LIAISON PROGRESS NOTE - NSBHASSESSMENTFT_PSY_ALL_CORE
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     10/7: patient seen today, tolerating medication well and with improvement of her behavior.       Recs.   Maintain delirium precautions   Avoid anticholinergic agents   Frequent re orientation, hydration, PT when possible,   EKg reviewed   Continue Zyprexa 2.5mg daily and 5mg po at bedtime   For breakthrough agitation can give additional Zyprexa 2.5 - 5 mg PO or IM Q6 hours PRN   Will follow  
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       Recs.   Maintain delirium precautions   Avoid anticholinergic agents   Frequent re orientation, hydration, PT when possible,   EKg reviewed   increase  ZYprexa 2.5mg daily and 5mg po at bedtime   For breakthrough agitation can give additional Zyprexa 2.5 - 5 mg PO or IM Q6 hours PRN   Will follow

## 2022-10-07 NOTE — PROGRESS NOTE ADULT - NUTRITIONAL ASSESSMENT
This patient has been assessed with a concern for Malnutrition and has been determined to have a diagnosis/diagnoses of Moderate protein-calorie malnutrition.    This patient is being managed with:   Diet Low Fiber-  Entered: Sep 16 2022  7:13PM    

## 2022-10-07 NOTE — BH CONSULTATION LIAISON PROGRESS NOTE - NSBHCHARTREVIEWLAB_PSY_A_CORE FT
Basic Metabolic Panel - STAT (10.04.22 @ 22:32)    Sodium, Serum: 133 mmol/L    Potassium, Serum: 3.9 mmol/L    Chloride, Serum: 97 mmol/L    Carbon Dioxide, Serum: 28.0 mmol/L    Anion Gap, Serum: 8 mmol/L    Blood Urea Nitrogen, Serum: 9.6 mg/dL    Creatinine, Serum: 0.72 mg/dL    Glucose, Serum: 104 mg/dL    Calcium, Total Serum: 8.5: Prior Reference Range of 8.6 – 10.2 was amended as of 7/26/2022 to 8.4 –  10.5. mg/dL    eGFR: 85: The estimated glomerular filtration rate (eGFR) is calculated using the  2021 CKD-EPI creatinine equation, which does not have a coefficient for  race and is validated in individuals 18 years of age and older (N Engl J  Med 2021; 385:7413-1064). Creatinine-based eGFR may be inaccurate in  various situations including but not limited to extremes of muscle mass,  altered dietary protein intake, or medications that affect renal tubular  creatinine secretion. mL/min/1.73m2  
Basic Metabolic Panel - STAT (10.04.22 @ 22:32)    Sodium, Serum: 133 mmol/L    Potassium, Serum: 3.9 mmol/L    Chloride, Serum: 97 mmol/L    Carbon Dioxide, Serum: 28.0 mmol/L    Anion Gap, Serum: 8 mmol/L    Blood Urea Nitrogen, Serum: 9.6 mg/dL    Creatinine, Serum: 0.72 mg/dL    Glucose, Serum: 104 mg/dL    Calcium, Total Serum: 8.5: Prior Reference Range of 8.6 – 10.2 was amended as of 7/26/2022 to 8.4 –  10.5. mg/dL    eGFR: 85: The estimated glomerular filtration rate (eGFR) is calculated using the  2021 CKD-EPI creatinine equation, which does not have a coefficient for  race and is validated in individuals 18 years of age and older (N Engl J  Med 2021; 385:8521-1985). Creatinine-based eGFR may be inaccurate in  various situations including but not limited to extremes of muscle mass,  altered dietary protein intake, or medications that affect renal tubular  creatinine secretion. mL/min/1.73m2

## 2022-10-07 NOTE — BH CONSULTATION LIAISON PROGRESS NOTE - NSBHFUPINTERVALHXFT_PSY_A_CORE
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 Patient stating she is not suicidal and said she was because she was angry and noone is doing anything for her. patient states she wants to go home but her daughter is talking about her going to another rehab. Patient still labile with racing thoughts but with no s/h ideation or AVH     As per nursing, patient took ZYprexa overnight, woke up much better and more calm but then later became more irritable 
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 Patient alert, oriented x3 and speech more organized. Patient states she still had difficulty with sleep but ate well and talks of going to a  new Rehab her daughter found and currently denying any s/h ideation or AVH

## 2022-10-07 NOTE — BH CONSULTATION LIAISON PROGRESS NOTE - NSBHCHARTREVIEWINVESTIGATE_PSY_A_CORE FT
< from: 12 Lead ECG (10.04.22 @ 22:40) >      Ventricular Rate 82 BPM    Atrial Rate 82 BPM    P-R Interval 170 ms    QRS Duration 86 ms    Q-T Interval 364 ms    QTC Calculation(Bazett) 425 ms    P Axis 75 degrees    R Axis 39 degrees    T Axis 40 degrees    Diagnosis Line Sinus rhythm with marked sinus arrhythmia    Confirmed by ALMA SCHRADER ([id number]) on 10/5/2022 3:43:15 PM    < end of copied text >    
< from: 12 Lead ECG (10.04.22 @ 22:40) >      Ventricular Rate 82 BPM    Atrial Rate 82 BPM    P-R Interval 170 ms    QRS Duration 86 ms    Q-T Interval 364 ms    QTC Calculation(Bazett) 425 ms    P Axis 75 degrees    R Axis 39 degrees    T Axis 40 degrees    Diagnosis Line Sinus rhythm with marked sinus arrhythmia    Confirmed by ALMA SCHRADER ([id number]) on 10/5/2022 3:43:15 PM    < end of copied text >

## 2022-10-08 LAB
CULTURE RESULTS: SIGNIFICANT CHANGE UP
CULTURE RESULTS: SIGNIFICANT CHANGE UP
SPECIMEN SOURCE: SIGNIFICANT CHANGE UP
SPECIMEN SOURCE: SIGNIFICANT CHANGE UP

## 2022-10-08 PROCEDURE — 99232 SBSQ HOSP IP/OBS MODERATE 35: CPT

## 2022-10-08 RX ADMIN — Medication 1 MILLIGRAM(S): at 08:41

## 2022-10-08 RX ADMIN — LISINOPRIL 10 MILLIGRAM(S): 2.5 TABLET ORAL at 05:34

## 2022-10-08 RX ADMIN — OLANZAPINE 2.5 MILLIGRAM(S): 15 TABLET, FILM COATED ORAL at 08:43

## 2022-10-08 RX ADMIN — Medication 0.5 MILLIGRAM(S): at 14:41

## 2022-10-08 RX ADMIN — OLANZAPINE 5 MILLIGRAM(S): 15 TABLET, FILM COATED ORAL at 14:40

## 2022-10-08 NOTE — PROGRESS NOTE ADULT - ASSESSMENT
76 y/o female with PMH of RA, HTN, fibromyalgia, recurrent UTIs and C Diff  was sent to the ED from [facility] for AMS. Started on PipTazo for AMS 2/2 UTI     *Sepsis with acute encephalopathy due to UTI (present on admission)    Resolved  Per family lexy has been having worsening dementia   Urine culture with E.Coli  Completed course of PipTazo with PO vanc for  C Diff ppx     *Suspected dementia  Lack of formal diagnosis  Per collateral with family likely has been progressing for some time   Off 1:1  Titrate Zyprexa, 2.5mg am, 5 mg pm   psy consult appreciated  currently pleasant     *HTN  Ramipril 2.5mg (equivalent to Lisinopril 10mg)    *RA/Fibromyalgia   MTX 20mg q Friday   Folic acid   Avoid narcotics     *Anxiety   Xanax 0.5mg bid PRN     *Supportive   DVT prophylaxis: Heparin sc   Diet: DASH     Dispo: medically stable for discharge, pending placement

## 2022-10-09 PROCEDURE — 99232 SBSQ HOSP IP/OBS MODERATE 35: CPT

## 2022-10-09 RX ADMIN — Medication 650 MILLIGRAM(S): at 17:25

## 2022-10-09 RX ADMIN — OLANZAPINE 5 MILLIGRAM(S): 15 TABLET, FILM COATED ORAL at 12:57

## 2022-10-09 RX ADMIN — Medication 1 MILLIGRAM(S): at 12:57

## 2022-10-09 RX ADMIN — OLANZAPINE 2.5 MILLIGRAM(S): 15 TABLET, FILM COATED ORAL at 09:15

## 2022-10-09 RX ADMIN — Medication 650 MILLIGRAM(S): at 18:25

## 2022-10-09 RX ADMIN — LISINOPRIL 10 MILLIGRAM(S): 2.5 TABLET ORAL at 05:22

## 2022-10-09 NOTE — PROGRESS NOTE ADULT - ASSESSMENT
78 y/o female with PMH of RA, HTN, fibromyalgia, recurrent UTIs and C Diff  was sent to the ED from [facility] for AMS. Started on PipTazo for AMS 2/2 UTI     *Sepsis with acute encephalopathy due to UTI (present on admission)    Resolved  Per family lexy has been having worsening dementia   Urine culture with E.Coli  Completed course of PipTazo with PO vanc for  C Diff ppx   Denies of any current diarrhea     *Suspected dementia  Lack of formal diagnosis  Per collateral with family likely has been progressing for some time   Off 1:1  Titrate Zyprexa, 2.5mg am, 5 mg pm   psy consult appreciated  currently pleasant     *HTN  Ramipril 2.5mg (equivalent to Lisinopril 10mg)    *RA/Fibromyalgia   MTX 20mg q Friday   Folic acid   Avoid narcotics     *Anxiety   Xanax 0.5mg bid PRN     *Supportive   DVT prophylaxis: Heparin sc   Diet: DASH     Dispo: medically stable for discharge, pt has accepting facility, however do not take pt over the weekend. CM aware

## 2022-10-10 ENCOUNTER — TRANSCRIPTION ENCOUNTER (OUTPATIENT)
Age: 77
End: 2022-10-10

## 2022-10-10 VITALS
RESPIRATION RATE: 18 BRPM | SYSTOLIC BLOOD PRESSURE: 133 MMHG | HEART RATE: 100 BPM | TEMPERATURE: 98 F | OXYGEN SATURATION: 98 % | DIASTOLIC BLOOD PRESSURE: 73 MMHG

## 2022-10-10 PROCEDURE — 99239 HOSP IP/OBS DSCHRG MGMT >30: CPT

## 2022-10-10 RX ORDER — POLYETHYLENE GLYCOL 3350 17 G/17G
17 POWDER, FOR SOLUTION ORAL ONCE
Refills: 0 | Status: COMPLETED | OUTPATIENT
Start: 2022-10-10 | End: 2022-10-10

## 2022-10-10 RX ORDER — OLANZAPINE 15 MG/1
1 TABLET, FILM COATED ORAL
Qty: 0 | Refills: 0 | DISCHARGE
Start: 2022-10-10

## 2022-10-10 RX ADMIN — OLANZAPINE 2.5 MILLIGRAM(S): 15 TABLET, FILM COATED ORAL at 08:51

## 2022-10-10 RX ADMIN — Medication 1 MILLIGRAM(S): at 12:51

## 2022-10-10 RX ADMIN — Medication 650 MILLIGRAM(S): at 08:51

## 2022-10-10 RX ADMIN — Medication 650 MILLIGRAM(S): at 09:40

## 2022-10-10 RX ADMIN — LISINOPRIL 10 MILLIGRAM(S): 2.5 TABLET ORAL at 05:14

## 2022-10-10 RX ADMIN — OLANZAPINE 5 MILLIGRAM(S): 15 TABLET, FILM COATED ORAL at 12:50

## 2022-10-10 RX ADMIN — POLYETHYLENE GLYCOL 3350 17 GRAM(S): 17 POWDER, FOR SOLUTION ORAL at 10:00

## 2022-10-10 NOTE — PROGRESS NOTE ADULT - SUBJECTIVE AND OBJECTIVE BOX
[facility] Division of Hospital Medicine    Chief Complaint:  UTI     SUBJECTIVE / OVERNIGHT EVENTS:  Pt examined lying in bed  Comfortable and calm this am however becoming irate through the day   Patient denies chest pain, SOB, abd pain, N/V, fever, chills any other complaints. All remainder ROS negative.       MEDICATIONS  (STANDING):  folic acid 1 milliGRAM(s) Oral daily  lisinopril 10 milliGRAM(s) Oral daily  methotrexate 20 milliGRAM(s) Oral <User Schedule>  OLANZapine 2.5 milliGRAM(s) Oral <User Schedule>  OLANZapine 5 milliGRAM(s) Oral daily    MEDICATIONS  (PRN):  acetaminophen     Tablet .. 650 milliGRAM(s) Oral every 6 hours PRN Temp greater or equal to 38C (100.4F), Mild Pain (1 - 3)  ALPRAZolam 0.5 milliGRAM(s) Oral two times a day PRN anxiety  aluminum hydroxide/magnesium hydroxide/simethicone Suspension 30 milliLiter(s) Oral every 4 hours PRN Dyspepsia  melatonin 3 milliGRAM(s) Oral at bedtime PRN Insomnia  ondansetron Injectable 4 milliGRAM(s) IV Push every 8 hours PRN Nausea and/or Vomiting        PHYSICAL EXAM:  Vital Signs Last 24 Hrs  T(C): 36.5 (06 Oct 2022 10:02), Max: 36.5 (06 Oct 2022 04:41)  T(F): 97.7 (06 Oct 2022 10:02), Max: 97.7 (06 Oct 2022 04:41)  HR: 92 (06 Oct 2022 10:02) (82 - 92)  BP: 100/61 (06 Oct 2022 10:02) (100/61 - 136/79)  BP(mean): --  RR: 18 (06 Oct 2022 10:02) (18 - 18)  SpO2: 96% (06 Oct 2022 10:02) (92% - 96%)    Parameters below as of 06 Oct 2022 10:02  Patient On (Oxygen Delivery Method): room air        CONSTITUTIONAL: Non toxic appearing thin female lying in bed  ENMT: Moist oral mucosa, no pharyngeal injection or exudates; normal dentition  RESPIRATORY: Normal respiratory effort; lungs are clear to auscultation bilaterally  CARDIOVASCULAR: Regular rate and rhythm, normal S1 and S2, no murmur/rub/gallop; No lower extremity edema; Peripheral pulses are 2+ bilaterally  ABDOMEN: Nontender to palpation, normoactive bowel sounds, no rebound/guarding; No hepatosplenomegaly  MUSCLOSKELETAL:  no clubbing or cyanosis of digits; no joint swelling or tenderness to palpation  PSYCH: A+O to person, place, and time; anxious affect  NEUROLOGY: CN 2-12 are intact and symmetric; no gross sensory deficits;   SKIN: No rashes; no palpable lesions      LABS:                                                      11.3   8.42  )-----------( 641      ( 06 Oct 2022 06:50 )             35.1   10-    141  |  104  |  7.3<L>  ----------------------------<  83  3.9   |  27.0  |  0.69    Ca    8.9      06 Oct 2022 06:50          Color: Yellow / Appearance: Slightly Turbid / S.005 / pH: x  Gluc: x / Ketone: Negative  / Bili: Negative / Urobili: Negative   Blood: x / Protein: Negative / Nitrite: Positive   Leuk Esterase: Moderate / RBC: 0-2 /HPF / WBC 6-10 /HPF   Sq Epi: x / Non Sq Epi: Moderate / Bacteria: Few        CAPILLARY BLOOD GLUCOSE      POCT Blood Glucose.: 86 mg/dL (02 Oct 2022 18:17)        RADIOLOGY & ADDITIONAL TESTS:    CT head  IMPRESSION:  Ischemic white matter disease less than typical for age  Diffuse brain volume loss lower range typical for age  Intracranial atherosclerosis                                        
HPI  Pt is a 76yo F admitted to [facility] for metabolic encephalopathy secondary to UTI.   Pt was seen and examined at bedside. No overall complaints.     Vital Signs Last 24 Hrs  T(C): 36.6 (08 Oct 2022 05:28), Max: 36.7 (07 Oct 2022 17:21)  T(F): 97.9 (08 Oct 2022 05:28), Max: 98.1 (07 Oct 2022 21:00)  HR: 86 (08 Oct 2022 05:28) (86 - 94)  BP: 120/73 (08 Oct 2022 05:28) (104/61 - 120/73)  BP(mean): --  RR: 18 (08 Oct 2022 05:28) (18 - 18)  SpO2: 92% (08 Oct 2022 05:28) (92% - 96%)    Parameters below as of 08 Oct 2022 05:28  Patient On (Oxygen Delivery Method): room air        I&O's Summary      CAPILLARY BLOOD GLUCOSE          PHYSICAL EXAM:    Constitutional: NAD,    HEENT: PERR, EOMI,    Neck: Soft and supple,   Respiratory: Breath sounds are clear bilaterally,    Cardiovascular: S1 and S2   Gastrointestinal: Bowel Sounds present, soft, nontender, nondistended, no guarding, no rebound  Extremities: No peripheral edema  Vascular: 2+ peripheral pulses  Neurological: A/O x 3,    Musculoskeletal: 5/5 strength b/l upper and lower extremities  Skin: No rashes    MEDICATIONS:  MEDICATIONS  (STANDING):  folic acid 1 milliGRAM(s) Oral daily  influenza  Vaccine (HIGH DOSE) 0.7 milliLiter(s) IntraMuscular once  lisinopril 10 milliGRAM(s) Oral daily  methotrexate 20 milliGRAM(s) Oral <User Schedule>  OLANZapine 2.5 milliGRAM(s) Oral <User Schedule>  OLANZapine 5 milliGRAM(s) Oral daily      LABS: All Labs Reviewed:                Blood Culture:     RADIOLOGY/EKG:    DVT PPX:    ADVANCED DIRECTIVE:    DISPOSITION:
HPI  Pt is a 78yo F admitted to [facility] for metabolic encephalopathy secondary to UTI.   Pt was seen and examined at bedside. Pt states she want to leave the hospital. Explained to pt that she will likely go within next [id number] hemodynamically stable. Pt DENIES of any diarrhea for 2 days       Vital Signs Last 24 Hrs  T(C): 36.5 (09 Oct 2022 05:35), Max: 36.9 (08 Oct 2022 15:35)  T(F): 97.7 (09 Oct 2022 05:35), Max: 98.5 (08 Oct 2022 15:35)  HR: 93 (09 Oct 2022 05:35) (85 - 96)  BP: 114/66 (09 Oct 2022 05:35) (114/66 - 121/64)  BP(mean): --  RR: 18 (09 Oct 2022 05:35) (18 - 18)  SpO2: 90% (09 Oct 2022 05:35) (90% - 93%)    Parameters below as of 09 Oct 2022 05:35  Patient On (Oxygen Delivery Method): room air        I&O's Summary      CAPILLARY BLOOD GLUCOSE          PHYSICAL EXAM:  Constitutional: NAD,    HEENT: PERR, EOMI,    Neck: Soft and supple,   Respiratory: Breath sounds are clear bilaterally,    Cardiovascular: S1 and S2   Gastrointestinal: Bowel Sounds present, soft, nontender, nondistended, no guarding, no rebound  Extremities: No peripheral edema  Vascular: 2+ peripheral pulses  Neurological: A/O x 3,    Musculoskeletal: 5/5 strength b/l upper and lower extremities  Skin: No rashes      MEDICATIONS:  MEDICATIONS  (STANDING):  folic acid 1 milliGRAM(s) Oral daily  influenza  Vaccine (HIGH DOSE) 0.7 milliLiter(s) IntraMuscular once  lisinopril 10 milliGRAM(s) Oral daily  methotrexate 20 milliGRAM(s) Oral <User Schedule>  OLANZapine 5 milliGRAM(s) Oral daily  OLANZapine 2.5 milliGRAM(s) Oral <User Schedule>      LABS: All Labs Reviewed:                Blood Culture:     RADIOLOGY/EKG:    DVT PPX:    ADVANCED DIRECTIVE:    DISPOSITION:
[facility] Division of Hospital Medicine    Chief Complaint:  UTI     SUBJECTIVE / OVERNIGHT EVENTS:  Pt examined lying in bed  Reports remembering the 'dream' regarding marriage to a dog etc. Adamantly denies any s/s of dementia (w/o prompting)  After interview pts son reports significant addictive behavior to opoid analgesics as well as worsening neglect, memory issues etc.   Patient denies chest pain, SOB, abd pain, N/V, fever, chills any other complaints. All remainder ROS negative.     MEDICATIONS  (STANDING):  folic acid 1 milliGRAM(s) Oral daily  lisinopril 10 milliGRAM(s) Oral daily  methotrexate 20 milliGRAM(s) Oral <User Schedule>  piperacillin/tazobactam IVPB.. 3.375 Gram(s) IV Intermittent every 8 hours  vancomycin    Solution 125 milliGRAM(s) Oral every 12 hours    MEDICATIONS  (PRN):  acetaminophen     Tablet .. 650 milliGRAM(s) Oral every 6 hours PRN Temp greater or equal to 38C (100.4F), Mild Pain (1 - 3)  ALPRAZolam 0.5 milliGRAM(s) Oral two times a day PRN anxiety  aluminum hydroxide/magnesium hydroxide/simethicone Suspension 30 milliLiter(s) Oral every 4 hours PRN Dyspepsia  melatonin 3 milliGRAM(s) Oral at bedtime PRN Insomnia  ondansetron Injectable 4 milliGRAM(s) IV Push every 8 hours PRN Nausea and/or Vomiting        I&O's Summary      PHYSICAL EXAM:  Vital Signs Last 24 Hrs  T(C): 37 (03 Oct 2022 12:03), Max: 38.1 (02 Oct 2022 18:20)  T(F): 98.6 (03 Oct 2022 12:03), Max: 100.6 (02 Oct 2022 18:20)  HR: 87 (03 Oct 2022 12:03) (80 - 120)  BP: 117/75 (03 Oct 2022 12:03) (109/71 - 138/83)  BP(mean): --  RR: 18 (03 Oct 2022 12:03) (18 - 20)  SpO2: 99% (03 Oct 2022 12:03) (94% - 99%)    Parameters below as of 03 Oct 2022 12:03  Patient On (Oxygen Delivery Method): room air        CONSTITUTIONAL: Non toxic appearing thin female lying in bed  ENMT: Moist oral mucosa, no pharyngeal injection or exudates; normal dentition  RESPIRATORY: Normal respiratory effort; lungs are clear to auscultation bilaterally  CARDIOVASCULAR: Regular rate and rhythm, normal S1 and S2, no murmur/rub/gallop; No lower extremity edema; Peripheral pulses are 2+ bilaterally  ABDOMEN: Nontender to palpation, normoactive bowel sounds, no rebound/guarding; No hepatosplenomegaly  MUSCLOSKELETAL:  no clubbing or cyanosis of digits; no joint swelling or tenderness to palpation  PSYCH: A+O to person, place, and time; anxious affect  NEUROLOGY: CN 2-12 are intact and symmetric; no gross sensory deficits;   SKIN: No rashes; no palpable lesions      LABS:                        11.4   11.30 )-----------( 647      ( 03 Oct 2022 02:49 )             34.8     10-    138  |  102  |  10.8  ----------------------------<  102<H>  4.6   |  28.0  |  0.77    Ca    8.8      03 Oct 2022 02:49    TPro  7.0  /  Alb  3.1<L>  /  TBili  0.5  /  DBili  x   /  AST  26  /  ALT  11  /  AlkPhos  92  10-02    PT/INR - ( 02 Oct 2022 18:35 )   PT: 13.2 sec;   INR: 1.14 ratio         PTT - ( 02 Oct 2022 18:35 )  PTT:29.1 sec      Urinalysis Basic - ( 02 Oct 2022 21:15 )    Color: Yellow / Appearance: Slightly Turbid / S.005 / pH: x  Gluc: x / Ketone: Negative  / Bili: Negative / Urobili: Negative   Blood: x / Protein: Negative / Nitrite: Positive   Leuk Esterase: Moderate / RBC: 0-2 /HPF / WBC 6-10 /HPF   Sq Epi: x / Non Sq Epi: Moderate / Bacteria: Few        CAPILLARY BLOOD GLUCOSE      POCT Blood Glucose.: 86 mg/dL (02 Oct 2022 18:17)        RADIOLOGY & ADDITIONAL TESTS:    CT head  IMPRESSION:  Ischemic white matter disease less than typical for age  Diffuse brain volume loss lower range typical for age  Intracranial atherosclerosis                                        
[facility] Division of Hospital Medicine    Chief Complaint:  UTI     SUBJECTIVE / OVERNIGHT EVENTS:  Pt examined lying in bed  calm at time of exam but per RN has been having extreme agitation   Patient denies chest pain, SOB, abd pain, N/V, fever, chills any other complaints. All remainder ROS negative.       MEDICATIONS  (STANDING):  folic acid 1 milliGRAM(s) Oral daily  lisinopril 10 milliGRAM(s) Oral daily  methotrexate 20 milliGRAM(s) Oral <User Schedule>  piperacillin/tazobactam IVPB.. 3.375 Gram(s) IV Intermittent every 8 hours  vancomycin    Solution 125 milliGRAM(s) Oral every 12 hours    MEDICATIONS  (PRN):  acetaminophen     Tablet .. 650 milliGRAM(s) Oral every 6 hours PRN Temp greater or equal to 38C (100.4F), Mild Pain (1 - 3)  ALPRAZolam 0.5 milliGRAM(s) Oral two times a day PRN anxiety  aluminum hydroxide/magnesium hydroxide/simethicone Suspension 30 milliLiter(s) Oral every 4 hours PRN Dyspepsia  melatonin 3 milliGRAM(s) Oral at bedtime PRN Insomnia  ondansetron Injectable 4 milliGRAM(s) IV Push every 8 hours PRN Nausea and/or Vomiting      PHYSICAL EXAM:  Vital Signs Last 24 Hrs  T(C): 36.4 (04 Oct 2022 12:20), Max: 36.6 (03 Oct 2022 15:41)  T(F): 97.6 (04 Oct 2022 12:20), Max: 97.8 (03 Oct 2022 15:41)  HR: 88 (04 Oct 2022 12:20) (87 - 88)  BP: 110/71 (04 Oct 2022 12:20) (110/71 - 135/71)  BP(mean): --  RR: 18 (04 Oct 2022 04:42) (18 - 18)  SpO2: 96% (04 Oct 2022 12:20) (92% - 96%)    Parameters below as of 04 Oct 2022 12:20  Patient On (Oxygen Delivery Method): room air        CONSTITUTIONAL: Non toxic appearing thin female lying in bed  ENMT: Moist oral mucosa, no pharyngeal injection or exudates; normal dentition  RESPIRATORY: Normal respiratory effort; lungs are clear to auscultation bilaterally  CARDIOVASCULAR: Regular rate and rhythm, normal S1 and S2, no murmur/rub/gallop; No lower extremity edema; Peripheral pulses are 2+ bilaterally  ABDOMEN: Nontender to palpation, normoactive bowel sounds, no rebound/guarding; No hepatosplenomegaly  MUSCLOSKELETAL:  no clubbing or cyanosis of digits; no joint swelling or tenderness to palpation  PSYCH: A+O to person, place, and time; anxious affect  NEUROLOGY: CN 2-12 are intact and symmetric; no gross sensory deficits;   SKIN: No rashes; no palpable lesions      LABS:                                   11.1   12.21 )-----------( 676      ( 04 Oct 2022 05:45 )             35.1   10-    135  |  99  |  8.0  ----------------------------<  99  4.1   |  26.0  |  0.67    Ca    8.6      04 Oct 2022 05:45    TPro  7.0  /  Alb  3.1<L>  /  TBili  0.5  /  DBili  x   /  AST  26  /  ALT  11  /  AlkPhos  92  10-02    Urinalysis Basic - ( 02 Oct 2022 21:15 )    Color: Yellow / Appearance: Slightly Turbid / S.005 / pH: x  Gluc: x / Ketone: Negative  / Bili: Negative / Urobili: Negative   Blood: x / Protein: Negative / Nitrite: Positive   Leuk Esterase: Moderate / RBC: 0-2 /HPF / WBC 6-10 /HPF   Sq Epi: x / Non Sq Epi: Moderate / Bacteria: Few        CAPILLARY BLOOD GLUCOSE      POCT Blood Glucose.: 86 mg/dL (02 Oct 2022 18:17)        RADIOLOGY & ADDITIONAL TESTS:    CT head  IMPRESSION:  Ischemic white matter disease less than typical for age  Diffuse brain volume loss lower range typical for age  Intracranial atherosclerosis                                        
[facility] Division of Hospital Medicine  Jaun Rodgers MD    Chief Complaint:  Patient is a 77y old  Female who presents with a chief complaint of UTI (09 Oct 2022 10:38)      SUBJECTIVE / OVERNIGHT EVENTS:  Patient seen and examined at bedside. No acute events reported overnight. No new complaints.    MEDICATIONS  (STANDING):  folic acid 1 milliGRAM(s) Oral daily  influenza  Vaccine (HIGH DOSE) 0.7 milliLiter(s) IntraMuscular once  lisinopril 10 milliGRAM(s) Oral daily  methotrexate 20 milliGRAM(s) Oral <User Schedule>  OLANZapine 2.5 milliGRAM(s) Oral <User Schedule>  OLANZapine 5 milliGRAM(s) Oral daily    MEDICATIONS  (PRN):  acetaminophen     Tablet .. 650 milliGRAM(s) Oral every 6 hours PRN Temp greater or equal to 38C (100.4F), Mild Pain (1 - 3)  ALPRAZolam 0.5 milliGRAM(s) Oral two times a day PRN anxiety  aluminum hydroxide/magnesium hydroxide/simethicone Suspension 30 milliLiter(s) Oral every 4 hours PRN Dyspepsia  melatonin 3 milliGRAM(s) Oral at bedtime PRN Insomnia  ondansetron Injectable 4 milliGRAM(s) IV Push every 8 hours PRN Nausea and/or Vomiting        I&O's Summary      PHYSICAL EXAM:  Vital Signs Last 24 Hrs  T(C): 36.8 (10 Oct 2022 05:34), Max: 37.3 (09 Oct 2022 20:00)  T(F): 98.2 (10 Oct 2022 05:34), Max: 99.2 (09 Oct 2022 20:00)  HR: 87 (10 Oct 2022 05:34) (87 - 101)  BP: 117/71 (10 Oct 2022 05:34) (96/62 - 137/63)  BP(mean): --  RR: 18 (10 Oct 2022 05:34) (18 - 18)  SpO2: 92% (10 Oct 2022 05:34) (91% - 94%)    Parameters below as of 10 Oct 2022 05:34  Patient On (Oxygen Delivery Method): room air            CONSTITUTIONAL: NAD  HEENT: NC/AT, PERRL, no JVD  RESPIRATORY: CTA bilaterally, normal effort  CARDIOVASCULAR: RRR, S1/S2+, no m/g/r  ABDOMEN: Nontender to palpation, normoactive bowel sounds, no rebound/guarding  MUSCULOSKELETAL: No edema, cyanosis or deformities.  PSYCH: Calm, affect appropriate.  NEUROLOGY: Awake, alert, no focal neurological deficits.   SKIN: No rashes; no palpable lesions  VASC: Distal pulses palpable    LABS:                    CAPILLARY BLOOD GLUCOSE            RADIOLOGY & ADDITIONAL TESTS:  Results Reviewed:   Imaging Personally Reviewed:  Electrocardiogram Personally Reviewed:                                          
[facility] Division of Hospital Medicine    Chief Complaint:  UTI     SUBJECTIVE / OVERNIGHT EVENTS:  Pt examined lying in bed  Extremely labile emotionally  Now stating she will kill herself if she goes to [facility]  Patient denies chest pain, SOB, abd pain, N/V, fever, chills any other complaints. All remainder ROS negative.       MEDICATIONS  (STANDING):  folic acid 1 milliGRAM(s) Oral daily  lisinopril 10 milliGRAM(s) Oral daily  methotrexate 20 milliGRAM(s) Oral <User Schedule>  piperacillin/tazobactam IVPB.. 3.375 Gram(s) IV Intermittent every 8 hours  vancomycin    Solution 125 milliGRAM(s) Oral every 12 hours    MEDICATIONS  (PRN):  acetaminophen     Tablet .. 650 milliGRAM(s) Oral every 6 hours PRN Temp greater or equal to 38C (100.4F), Mild Pain (1 - 3)  ALPRAZolam 0.5 milliGRAM(s) Oral two times a day PRN anxiety  aluminum hydroxide/magnesium hydroxide/simethicone Suspension 30 milliLiter(s) Oral every 4 hours PRN Dyspepsia  melatonin 3 milliGRAM(s) Oral at bedtime PRN Insomnia  ondansetron Injectable 4 milliGRAM(s) IV Push every 8 hours PRN Nausea and/or Vomiting      PHYSICAL EXAM:  Vital Signs Last 24 Hrs  T(C): 36.2 (05 Oct 2022 04:42), Max: 36.7 (05 Oct 2022 00:52)  T(F): 97.2 (05 Oct 2022 04:42), Max: 98 (05 Oct 2022 00:52)  HR: 82 (05 Oct 2022 04:42) (78 - 87)  BP: 106/57 (05 Oct 2022 04:42) (106/57 - 121/72)  BP(mean): --  RR: 18 (05 Oct 2022 04:42) (18 - 18)  SpO2: 97% (05 Oct 2022 04:42) (96% - 97%)    Parameters below as of 05 Oct 2022 04:42  Patient On (Oxygen Delivery Method): room air      CONSTITUTIONAL: Non toxic appearing thin female lying in bed  ENMT: Moist oral mucosa, no pharyngeal injection or exudates; normal dentition  RESPIRATORY: Normal respiratory effort; lungs are clear to auscultation bilaterally  CARDIOVASCULAR: Regular rate and rhythm, normal S1 and S2, no murmur/rub/gallop; No lower extremity edema; Peripheral pulses are 2+ bilaterally  ABDOMEN: Nontender to palpation, normoactive bowel sounds, no rebound/guarding; No hepatosplenomegaly  MUSCLOSKELETAL:  no clubbing or cyanosis of digits; no joint swelling or tenderness to palpation  PSYCH: A+O to person, place, and time; anxious affect  NEUROLOGY: CN 2-12 are intact and symmetric; no gross sensory deficits;   SKIN: No rashes; no palpable lesions      LABS:                                           10.4   11.71 )-----------( 636      ( 04 Oct 2022 22:32 )             31.9   10-04    133<L>  |  97<L>  |  9.6  ----------------------------<  104<H>  3.9   |  28.0  |  0.72    Ca    8.5      04 Oct 2022 22:32        Color: Yellow / Appearance: Slightly Turbid / S.005 / pH: x  Gluc: x / Ketone: Negative  / Bili: Negative / Urobili: Negative   Blood: x / Protein: Negative / Nitrite: Positive   Leuk Esterase: Moderate / RBC: 0-2 /HPF / WBC 6-10 /HPF   Sq Epi: x / Non Sq Epi: Moderate / Bacteria: Few        CAPILLARY BLOOD GLUCOSE      POCT Blood Glucose.: 86 mg/dL (02 Oct 2022 18:17)        RADIOLOGY & ADDITIONAL TESTS:    CT head  IMPRESSION:  Ischemic white matter disease less than typical for age  Diffuse brain volume loss lower range typical for age  Intracranial atherosclerosis                                        
[facility] Division of Hospital Medicine    Chief Complaint:  UTI     SUBJECTIVE / OVERNIGHT EVENTS:  Pt examined lying in bed  Behavioural status markedly improved today  D/w CM, pt was initially accepted to facility which has been rescinded  Patient denies chest pain, SOB, abd pain, N/V, fever, chills any other complaints. All remainder ROS negative.       MEDICATIONS  (STANDING):  folic acid 1 milliGRAM(s) Oral daily  influenza  Vaccine (HIGH DOSE) 0.7 milliLiter(s) IntraMuscular once  lisinopril 10 milliGRAM(s) Oral daily  methotrexate 20 milliGRAM(s) Oral <User Schedule>  OLANZapine 2.5 milliGRAM(s) Oral <User Schedule>  OLANZapine 5 milliGRAM(s) Oral daily    MEDICATIONS  (PRN):  acetaminophen     Tablet .. 650 milliGRAM(s) Oral every 6 hours PRN Temp greater or equal to 38C (100.4F), Mild Pain (1 - 3)  ALPRAZolam 0.5 milliGRAM(s) Oral two times a day PRN anxiety  aluminum hydroxide/magnesium hydroxide/simethicone Suspension 30 milliLiter(s) Oral every 4 hours PRN Dyspepsia  melatonin 3 milliGRAM(s) Oral at bedtime PRN Insomnia  ondansetron Injectable 4 milliGRAM(s) IV Push every 8 hours PRN Nausea and/or Vomiting        PHYSICAL EXAM:  Vital Signs Last 24 Hrs  T(C): 36.7 (07 Oct 2022 17:21), Max: 36.7 (07 Oct 2022 05:04)  T(F): 98 (07 Oct 2022 17:21), Max: 98 (07 Oct 2022 05:04)  HR: 94 (07 Oct 2022 17:21) (87 - 94)  BP: 104/61 (07 Oct 2022 17:21) (104/61 - 106/72)  BP(mean): --  RR: 18 (07 Oct 2022 17:21) (16 - 18)  SpO2: 95% (07 Oct 2022 17:21) (95% - 96%)    Parameters below as of 07 Oct 2022 17:21  Patient On (Oxygen Delivery Method): room air      CONSTITUTIONAL: Non toxic appearing thin female lying in bed  ENMT: Moist oral mucosa, no pharyngeal injection or exudates; normal dentition  RESPIRATORY: Normal respiratory effort; lungs are clear to auscultation bilaterally  CARDIOVASCULAR: Regular rate and rhythm, normal S1 and S2, no murmur/rub/gallop; No lower extremity edema; Peripheral pulses are 2+ bilaterally  ABDOMEN: Nontender to palpation, normoactive bowel sounds, no rebound/guarding; No hepatosplenomegaly  MUSCLOSKELETAL:  no clubbing or cyanosis of digits; no joint swelling or tenderness to palpation  PSYCH: A+O to person, place, and time; improved affect  NEUROLOGY: CN 2-12 are intact and symmetric; no gross sensory deficits;   SKIN: No rashes; no palpable lesions      LABS:                                                      11.3   8.42  )-----------( 641      ( 06 Oct 2022 06:50 )             35.1   10-    141  |  104  |  7.3<L>  ----------------------------<  83  3.9   |  27.0  |  0.69    Ca    8.9      06 Oct 2022 06:50          Color: Yellow / Appearance: Slightly Turbid / S.005 / pH: x  Gluc: x / Ketone: Negative  / Bili: Negative / Urobili: Negative   Blood: x / Protein: Negative / Nitrite: Positive   Leuk Esterase: Moderate / RBC: 0-2 /HPF / WBC 6-10 /HPF   Sq Epi: x / Non Sq Epi: Moderate / Bacteria: Few        CAPILLARY BLOOD GLUCOSE      POCT Blood Glucose.: 86 mg/dL (02 Oct 2022 18:17)        RADIOLOGY & ADDITIONAL TESTS:    CT head  IMPRESSION:  Ischemic white matter disease less than typical for age  Diffuse brain volume loss lower range typical for age  Intracranial atherosclerosis

## 2022-10-10 NOTE — PROGRESS NOTE ADULT - ASSESSMENT
78 y/o female with PMH of RA, HTN, fibromyalgia, recurrent UTIs and C Diff  was sent to the ED from [facility] for AMS. Started on PipTazo for AMS 2/2 UTI     Sepsis with acute encephalopathy due to UTI (present on admission)    - Resolved  - Per family likely has been having worsening dementia   - UCx w/ E. Coli  - Completed course of PipTazo with PO vanc for  C Diff ppx   - Denies of any current diarrhea     Suspected dementia  - Lack of formal diagnosis  - Per collateral with family likely has been progressing for some time   - Off 1:1  - Titrate Zyprexa, 2.5mg am, 5 mg pm   - psy consult appreciated  - currently pleasant     HTN  - Ramipril 2.5mg (equivalent to Lisinopril 10mg)    RA/Fibromyalgia   - MTX 20mg q Friday   - Folic acid   - Avoid narcotics     Anxiety   - Xanax 0.5mg bid PRN     Supportive   DVT prophylaxis: Heparin sc   Diet: DASH     Dispo: medically stable for discharge, pt has accepting facility, awaiting bed.

## 2022-10-10 NOTE — DISCHARGE NOTE NURSING/CASE MANAGEMENT/SOCIAL WORK - [id number]_ALL_CORE
For information on Fall & Injury Prevention, visit: [email or web address] OR  [email or web address] OR  https://www.cdc.gov/steadi/patient.html

## 2022-10-10 NOTE — PROGRESS NOTE ADULT - PROVIDER SPECIALTY LIST ADULT
Hospitalist
Internal Medicine
Internal Medicine
Hospitalist
Hospitalist
Internal Medicine

## 2022-10-10 NOTE — DISCHARGE NOTE NURSING/CASE MANAGEMENT/SOCIAL WORK - PATIENT PORTAL LINK FT
You can access the FollowMyHealth Patient Portal offered by [facility] by registering at the following website: [email or web address]. By joining [facility]s FollowMyHealth portal, you will also be able to view your health information using other applications (apps) compatible with our system.

## 2022-11-09 PROCEDURE — 82435 ASSAY OF BLOOD CHLORIDE: CPT

## 2022-11-09 PROCEDURE — 81001 URINALYSIS AUTO W/SCOPE: CPT

## 2022-11-09 PROCEDURE — 82550 ASSAY OF CK (CPK): CPT

## 2022-11-09 PROCEDURE — 85014 HEMATOCRIT: CPT

## 2022-11-09 PROCEDURE — 84484 ASSAY OF TROPONIN QUANT: CPT

## 2022-11-09 PROCEDURE — 80048 BASIC METABOLIC PNL TOTAL CA: CPT

## 2022-11-09 PROCEDURE — 71045 X-RAY EXAM CHEST 1 VIEW: CPT

## 2022-11-09 PROCEDURE — 99285 EMERGENCY DEPT VISIT HI MDM: CPT

## 2022-11-09 PROCEDURE — 82947 ASSAY GLUCOSE BLOOD QUANT: CPT

## 2022-11-09 PROCEDURE — 85018 HEMOGLOBIN: CPT

## 2022-11-09 PROCEDURE — 74176 CT ABD & PELVIS W/O CONTRAST: CPT

## 2022-11-09 PROCEDURE — 96365 THER/PROPH/DIAG IV INF INIT: CPT

## 2022-11-09 PROCEDURE — 85025 COMPLETE CBC W/AUTO DIFF WBC: CPT

## 2022-11-09 PROCEDURE — 83605 ASSAY OF LACTIC ACID: CPT

## 2022-11-09 PROCEDURE — 84295 ASSAY OF SERUM SODIUM: CPT

## 2022-11-09 PROCEDURE — 0225U NFCT DS DNA&RNA 21 SARSCOV2: CPT

## 2022-11-09 PROCEDURE — 87040 BLOOD CULTURE FOR BACTERIA: CPT

## 2022-11-09 PROCEDURE — 96375 TX/PRO/DX INJ NEW DRUG ADDON: CPT

## 2022-11-09 PROCEDURE — 36415 COLL VENOUS BLD VENIPUNCTURE: CPT

## 2022-11-09 PROCEDURE — 85610 PROTHROMBIN TIME: CPT

## 2022-11-09 PROCEDURE — 84132 ASSAY OF SERUM POTASSIUM: CPT

## 2022-11-09 PROCEDURE — 82330 ASSAY OF CALCIUM: CPT

## 2022-11-09 PROCEDURE — 93005 ELECTROCARDIOGRAM TRACING: CPT

## 2022-11-09 PROCEDURE — 82803 BLOOD GASES ANY COMBINATION: CPT

## 2022-11-09 PROCEDURE — U0003: CPT

## 2022-11-09 PROCEDURE — 87086 URINE CULTURE/COLONY COUNT: CPT

## 2022-11-09 PROCEDURE — 85027 COMPLETE CBC AUTOMATED: CPT

## 2022-11-09 PROCEDURE — 85730 THROMBOPLASTIN TIME PARTIAL: CPT

## 2022-11-09 PROCEDURE — U0005: CPT

## 2022-11-09 PROCEDURE — 80053 COMPREHEN METABOLIC PANEL: CPT

## 2022-11-09 PROCEDURE — 82962 GLUCOSE BLOOD TEST: CPT

## 2022-11-09 PROCEDURE — 87186 SC STD MICRODIL/AGAR DIL: CPT

## 2022-11-09 PROCEDURE — 70450 CT HEAD/BRAIN W/O DYE: CPT | Mod: MA

## 2023-01-21 NOTE — ED ADULT NURSE NOTE - NS PRO PASSIVE SMOKE EXP
Writer precepting ALBARO Mahoney. Writer agrees with Maru's documentation on 1/21/2023 of patient. Writer assisted in discharging patient.   No

## 2023-06-27 ENCOUNTER — APPOINTMENT (OUTPATIENT)
Dept: DERMATOLOGY | Facility: CLINIC | Age: 78
End: 2023-06-27
Payer: MEDICARE

## 2023-06-27 PROCEDURE — 99213 OFFICE O/P EST LOW 20 MIN: CPT | Mod: 25

## 2023-06-27 PROCEDURE — 11102 TANGNTL BX SKIN SINGLE LES: CPT

## 2023-07-27 ENCOUNTER — APPOINTMENT (OUTPATIENT)
Dept: DERMATOLOGY | Facility: CLINIC | Age: 78
End: 2023-07-27
Payer: MEDICARE

## 2023-07-27 DIAGNOSIS — C44.519 BASAL CELL CARCINOMA OF SKIN OF OTHER PART OF TRUNK: ICD-10-CM

## 2023-07-27 PROCEDURE — 17313 MOHS 1 STAGE T/A/L: CPT

## 2023-07-27 PROCEDURE — 12032 INTMD RPR S/A/T/EXT 2.6-7.5: CPT

## 2023-07-28 PROBLEM — C44.519 BASAL CELL CARCINOMA OF BACK: Status: ACTIVE | Noted: 2023-07-26

## 2023-07-28 NOTE — HISTORY OF PRESENT ILLNESS
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 This lesion was previously treated with Ed&C at least once and possibly twice and then recurred approx 1 mo prior to bx. \par \par Skin Cancer History: Believes she had Mohs on the face many yrs ago \par \par SH: Retired , [employer], granddaughter received a [employer] scholarship. \par \par Allergies: statins \par Smoking: Former - 40 yrs ago\par \par Pertinent positives noted below \par Blood thinners: fish oil\par \par History of HIV or hepatitis: No\par Antibiotic Prophylaxis: None \par Medical implants: None\par \par The patient's review of systems questionnaire was reviewed. Education needs were identified. There were no barriers to learning.\par

## 2023-07-28 NOTE — PHYSICAL EXAM
[Alert] : alert [Oriented x 3] : ~L oriented x 3 [Well Nourished] : well nourished [Conjunctiva Non-injected] : conjunctiva non-injected [No Visual Lymphadenopathy] : no visual  lymphadenopathy [No Clubbing] : no clubbing [No Edema] : no edema [No Bromhidrosis] : no bromhidrosis [No Chromhidrosis] : no chromhidrosis [FreeTextEntry3] : 1.5x2.0cm pink atrophic bx site with medial aspect of sclerotic white scar on the right superior back

## 2023-08-10 ENCOUNTER — APPOINTMENT (OUTPATIENT)
Dept: DERMATOLOGY | Facility: CLINIC | Age: 78
End: 2023-08-10

## 2023-11-30 ENCOUNTER — APPOINTMENT (OUTPATIENT)
Dept: DERMATOLOGY | Facility: CLINIC | Age: 78
End: 2023-11-30

## 2024-04-16 ENCOUNTER — APPOINTMENT (OUTPATIENT)
Dept: DERMATOLOGY | Facility: CLINIC | Age: 79
End: 2024-04-16
Payer: MEDICARE

## 2024-04-16 PROCEDURE — 99214 OFFICE O/P EST MOD 30 MIN: CPT

## 2024-06-25 ENCOUNTER — APPOINTMENT (OUTPATIENT)
Dept: DERMATOLOGY | Facility: CLINIC | Age: 79
End: 2024-06-25

## 2024-10-14 ENCOUNTER — APPOINTMENT (OUTPATIENT)
Dept: DERMATOLOGY | Facility: CLINIC | Age: 79
End: 2024-10-14

## 2025-02-24 ENCOUNTER — APPOINTMENT (OUTPATIENT)
Dept: DERMATOLOGY | Facility: CLINIC | Age: 80
End: 2025-02-24
Payer: MEDICARE

## 2025-02-24 PROCEDURE — 99214 OFFICE O/P EST MOD 30 MIN: CPT | Mod: 25

## 2025-02-24 PROCEDURE — 11102 TANGNTL BX SKIN SINGLE LES: CPT

## 2025-03-24 ENCOUNTER — APPOINTMENT (OUTPATIENT)
Dept: DERMATOLOGY | Facility: CLINIC | Age: 80
End: 2025-03-24
Payer: MEDICARE

## 2025-03-24 PROCEDURE — 99214 OFFICE O/P EST MOD 30 MIN: CPT | Mod: 25

## 2025-03-24 PROCEDURE — 17262 DSTRJ MAL LES T/A/L 1.1-2.0: CPT

## 2025-08-25 ENCOUNTER — APPOINTMENT (OUTPATIENT)
Dept: DERMATOLOGY | Facility: CLINIC | Age: 80
End: 2025-08-25
Payer: MEDICARE

## 2025-08-25 PROCEDURE — 17000 DESTRUCT PREMALG LESION: CPT | Mod: 59

## 2025-08-25 PROCEDURE — 99213 OFFICE O/P EST LOW 20 MIN: CPT | Mod: 25

## 2025-08-25 PROCEDURE — 11102 TANGNTL BX SKIN SINGLE LES: CPT

## 2025-08-25 PROCEDURE — 17003 DESTRUCT PREMALG LES 2-14: CPT | Mod: 59

## 2025-08-25 PROCEDURE — 11103 TANGNTL BX SKIN EA SEP/ADDL: CPT | Mod: 59
